# Patient Record
Sex: FEMALE | Race: BLACK OR AFRICAN AMERICAN | NOT HISPANIC OR LATINO | URBAN - METROPOLITAN AREA
[De-identification: names, ages, dates, MRNs, and addresses within clinical notes are randomized per-mention and may not be internally consistent; named-entity substitution may affect disease eponyms.]

---

## 2021-04-25 ENCOUNTER — EMERGENCY (EMERGENCY)
Facility: HOSPITAL | Age: 51
LOS: 1 days | Discharge: HOME | End: 2021-04-25
Attending: EMERGENCY MEDICINE | Admitting: EMERGENCY MEDICINE
Payer: COMMERCIAL

## 2021-04-25 VITALS
SYSTOLIC BLOOD PRESSURE: 162 MMHG | HEART RATE: 96 BPM | RESPIRATION RATE: 18 BRPM | OXYGEN SATURATION: 100 % | DIASTOLIC BLOOD PRESSURE: 113 MMHG | HEIGHT: 64 IN | TEMPERATURE: 98 F

## 2021-04-25 DIAGNOSIS — N97.9 FEMALE INFERTILITY, UNSPECIFIED: Chronic | ICD-10-CM

## 2021-04-25 DIAGNOSIS — Z98.89 OTHER SPECIFIED POSTPROCEDURAL STATES: Chronic | ICD-10-CM

## 2021-04-25 PROCEDURE — 99285 EMERGENCY DEPT VISIT HI MDM: CPT | Mod: 25

## 2021-04-25 PROCEDURE — 93010 ELECTROCARDIOGRAM REPORT: CPT

## 2021-04-25 NOTE — ED ADULT TRIAGE NOTE - CHIEF COMPLAINT QUOTE
c/o left chest pain and left arm pain/pulsing with weakness to left arm since yesterday around 3pm. also feels heart is racing. reports hx gastric ulcer, is on omeprazole and antibiotics x 1-2 weeks. denies other hx.

## 2021-04-26 VITALS
DIASTOLIC BLOOD PRESSURE: 101 MMHG | OXYGEN SATURATION: 100 % | HEART RATE: 82 BPM | RESPIRATION RATE: 17 BRPM | TEMPERATURE: 98 F | SYSTOLIC BLOOD PRESSURE: 145 MMHG

## 2021-04-26 LAB
ALBUMIN SERPL ELPH-MCNC: 4.6 G/DL — SIGNIFICANT CHANGE UP (ref 3.3–5)
ALP SERPL-CCNC: 47 U/L — SIGNIFICANT CHANGE UP (ref 40–120)
ALT FLD-CCNC: 11 U/L — SIGNIFICANT CHANGE UP (ref 4–33)
ANION GAP SERPL CALC-SCNC: 10 MMOL/L — SIGNIFICANT CHANGE UP (ref 7–14)
AST SERPL-CCNC: 21 U/L — SIGNIFICANT CHANGE UP (ref 4–32)
BASOPHILS # BLD AUTO: 0.02 K/UL — SIGNIFICANT CHANGE UP (ref 0–0.2)
BASOPHILS NFR BLD AUTO: 0.4 % — SIGNIFICANT CHANGE UP (ref 0–2)
BILIRUB SERPL-MCNC: 0.5 MG/DL — SIGNIFICANT CHANGE UP (ref 0.2–1.2)
BUN SERPL-MCNC: 8 MG/DL — SIGNIFICANT CHANGE UP (ref 7–23)
CALCIUM SERPL-MCNC: 9.9 MG/DL — SIGNIFICANT CHANGE UP (ref 8.4–10.5)
CHLORIDE SERPL-SCNC: 101 MMOL/L — SIGNIFICANT CHANGE UP (ref 98–107)
CO2 SERPL-SCNC: 27 MMOL/L — SIGNIFICANT CHANGE UP (ref 22–31)
CREAT SERPL-MCNC: 0.8 MG/DL — SIGNIFICANT CHANGE UP (ref 0.5–1.3)
D DIMER BLD IA.RAPID-MCNC: 198 NG/ML DDU — SIGNIFICANT CHANGE UP
EOSINOPHIL # BLD AUTO: 0.03 K/UL — SIGNIFICANT CHANGE UP (ref 0–0.5)
EOSINOPHIL NFR BLD AUTO: 0.6 % — SIGNIFICANT CHANGE UP (ref 0–6)
GLUCOSE SERPL-MCNC: 106 MG/DL — HIGH (ref 70–99)
HCT VFR BLD CALC: 38.4 % — SIGNIFICANT CHANGE UP (ref 34.5–45)
HGB BLD-MCNC: 13.1 G/DL — SIGNIFICANT CHANGE UP (ref 11.5–15.5)
IANC: 2.26 K/UL — SIGNIFICANT CHANGE UP (ref 1.5–8.5)
IMM GRANULOCYTES NFR BLD AUTO: 0.4 % — SIGNIFICANT CHANGE UP (ref 0–1.5)
LIDOCAIN IGE QN: 30 U/L — SIGNIFICANT CHANGE UP (ref 7–60)
LYMPHOCYTES # BLD AUTO: 2.27 K/UL — SIGNIFICANT CHANGE UP (ref 1–3.3)
LYMPHOCYTES # BLD AUTO: 45.9 % — HIGH (ref 13–44)
MCHC RBC-ENTMCNC: 32.4 PG — SIGNIFICANT CHANGE UP (ref 27–34)
MCHC RBC-ENTMCNC: 34.1 GM/DL — SIGNIFICANT CHANGE UP (ref 32–36)
MCV RBC AUTO: 95 FL — SIGNIFICANT CHANGE UP (ref 80–100)
MONOCYTES # BLD AUTO: 0.35 K/UL — SIGNIFICANT CHANGE UP (ref 0–0.9)
MONOCYTES NFR BLD AUTO: 7.1 % — SIGNIFICANT CHANGE UP (ref 2–14)
NEUTROPHILS # BLD AUTO: 2.26 K/UL — SIGNIFICANT CHANGE UP (ref 1.8–7.4)
NEUTROPHILS NFR BLD AUTO: 45.6 % — SIGNIFICANT CHANGE UP (ref 43–77)
NRBC # BLD: 0 /100 WBCS — SIGNIFICANT CHANGE UP
NRBC # FLD: 0 K/UL — SIGNIFICANT CHANGE UP
PLATELET # BLD AUTO: 242 K/UL — SIGNIFICANT CHANGE UP (ref 150–400)
POTASSIUM SERPL-MCNC: 3.8 MMOL/L — SIGNIFICANT CHANGE UP (ref 3.5–5.3)
POTASSIUM SERPL-SCNC: 3.8 MMOL/L — SIGNIFICANT CHANGE UP (ref 3.5–5.3)
PROT SERPL-MCNC: 8.4 G/DL — HIGH (ref 6–8.3)
RBC # BLD: 4.04 M/UL — SIGNIFICANT CHANGE UP (ref 3.8–5.2)
RBC # FLD: 11.9 % — SIGNIFICANT CHANGE UP (ref 10.3–14.5)
SODIUM SERPL-SCNC: 138 MMOL/L — SIGNIFICANT CHANGE UP (ref 135–145)
TROPONIN T, HIGH SENSITIVITY RESULT: <6 NG/L — SIGNIFICANT CHANGE UP
WBC # BLD: 4.95 K/UL — SIGNIFICANT CHANGE UP (ref 3.8–10.5)
WBC # FLD AUTO: 4.95 K/UL — SIGNIFICANT CHANGE UP (ref 3.8–10.5)

## 2021-04-26 PROCEDURE — 71045 X-RAY EXAM CHEST 1 VIEW: CPT | Mod: 26

## 2021-04-26 RX ORDER — LIDOCAINE 4 G/100G
10 CREAM TOPICAL ONCE
Refills: 0 | Status: COMPLETED | OUTPATIENT
Start: 2021-04-26 | End: 2021-04-26

## 2021-04-26 RX ORDER — SUCRALFATE 1 G
1 TABLET ORAL
Qty: 120 | Refills: 0
Start: 2021-04-26 | End: 2021-05-25

## 2021-04-26 RX ORDER — LIDOCAINE 4 G/100G
1 CREAM TOPICAL ONCE
Refills: 0 | Status: COMPLETED | OUTPATIENT
Start: 2021-04-26 | End: 2021-04-26

## 2021-04-26 RX ORDER — SUCRALFATE 1 G
1 TABLET ORAL ONCE
Refills: 0 | Status: COMPLETED | OUTPATIENT
Start: 2021-04-26 | End: 2021-04-26

## 2021-04-26 RX ADMIN — LIDOCAINE 1 PATCH: 4 CREAM TOPICAL at 02:37

## 2021-04-26 RX ADMIN — LIDOCAINE 10 MILLILITER(S): 4 CREAM TOPICAL at 02:52

## 2021-04-26 RX ADMIN — Medication 1 GRAM(S): at 02:52

## 2021-04-26 RX ADMIN — Medication 30 MILLILITER(S): at 02:52

## 2021-04-26 NOTE — ED PROVIDER NOTE - PSH
Female infertility  s/p oocyte retrieval x 4  H/O abdominoplasty    H/O arthroscopy of right knee    H/O  section

## 2021-04-26 NOTE — ED PROVIDER NOTE - PMH
Asthma  last episode 2013  Infertility, female    Knee injury  right  Tubal occlusion  fallopian tubes

## 2021-04-26 NOTE — ED PROVIDER NOTE - PHYSICAL EXAMINATION
GEN: Patient awake alert NAD.   HEENT: normocephalic, atraumatic, EOMI, no scleral icterus, moist MM  CARDIAC: RRR, S1, S2, no murmur.   PULM: CTA B/L no wheeze, rhonchi, rales.   ABD: + ttp pigastric and LUQ, ND, no rebound no guarding  MSK: Moving all extremities, no edema. 5/5 strength and full ROM in all extremities.     NEURO: A&Ox3, gait normal, no focal neurological deficits, CN 2-12 grossly intact  SKIN: warm, dry, no rash.

## 2021-04-26 NOTE — ED PROVIDER NOTE - PROGRESS NOTE DETAILS
Pt is very ecstatic as all pain has resolved. She noted heaviness has resolved for the first time. Labs show negative trop, can follow up with outpatient PMD and GI.

## 2021-04-26 NOTE — ED PROVIDER NOTE - NS ED ROS FT
GENERAL: No fever, chills  EYES: no vision changes, no discharge.   ENT: no difficulty swallowing or speaking   CARDIAC: no chest pain/pressure, SOB, lower extremity swelling + palliations.   PULMONARY: no cough, SOB  GI: + abdominal pain, no n/v/d  : no dysuria, no hematuria  SKIN: no rashes, no ecchymosis  NEURO: no headache, lightheadedness  MSK: + L arm pain, No joint pain, myalgia, weakness.

## 2021-04-26 NOTE — ED PROVIDER NOTE - PATIENT PORTAL LINK FT
You can access the FollowMyHealth Patient Portal offered by [facility] by registering at the following website: [email or web address]. By joining [employer]’s FollowMyHealth portal, you will also be able to view your health information using other applications (apps) compatible with our system.

## 2021-04-26 NOTE — ED PROVIDER NOTE - OBJECTIVE STATEMENT
49 yo F H pylori on triple therapy for ~2 weeks, no other medical problems, pw left arm pain, palpitations and abdominal pain x 2 days. triage note read, no chest pain, only palpitations, no associated SOB. L arm pain is achy, heaviness. Abd pain is epigastric and LUQ, sharp, constant, associated with mild nausea. no vomiting, diaphoresis, gu, symptoms.

## 2021-04-26 NOTE — ED ADULT NURSE NOTE - NSFALLRSKASSESSDT_ED_ALL_ED
Notified of hand xray that showed arthritis and degenerative changes, but no foreign bodies.   26-Apr-2021 03:01

## 2021-04-26 NOTE — ED PROVIDER NOTE - NSFOLLOWUPINSTRUCTIONS_ED_ALL_ED_FT
You were seen in the Emergency Department (ED) for arm pain, palpitations, and abdominal pain.     You are prescribed _. Please take as directed by your pharmacists.   Please take over the counter Tylenol or Ibuprofen as directed by the packaging for your pain.     Please follow up with your primary care doctor in the next 72 hours.     Please return to the ED if you experience any new or concerning symptoms, such as:   - worsening pain  - chest pain  - difficulty breathing  - passing out  - unable to eat or drink  - unable to move or feel part of your body  - fever, chills    Thank you for visiting a [facility] ED. You were seen in the Emergency Department (ED) for arm pain, palpitations, and abdominal pain.     You are prescribed Carafate. Please take 4 times a day as needed.   Take over the counter maalox according to packaging directions for as needed for your pain.     Please follow up with your GI and primary care doctor.     Please return to the ED if you experience any new or concerning symptoms, such as:   - worsening abdominal pain  - chest pain  - difficulty breathing  - passing out  - unable to eat or drink  - unable to move or feel part of your body  - fever, chills    Thank you for visiting a [facility] ED.

## 2021-04-26 NOTE — ED PROVIDER NOTE - CLINICAL SUMMARY MEDICAL DECISION MAKING FREE TEXT BOX
49 yo F H pylori on triple therapy for ~2 weeks, no other medical problems, pw left arm pain, palpitations and abdominal pain x 2 days. Likely cervical radiculopathy. Low suspicion for ACS given no chest pain only palpitations, will obtain trops and EKG, CXR. Lidocaine patch for pain. Possible gastritis or pancreatitis. Likely DC home.

## 2021-04-26 NOTE — ED ADULT NURSE NOTE - OBJECTIVE STATEMENT
50 yr old female pt presents to ED for evaluation of chest pain. pt describes left sided chest pain radiating to left arm and epigastric pain with associated n+v.

## 2021-04-26 NOTE — ED PROVIDER NOTE - ATTENDING CONTRIBUTION TO CARE
Attending note:   After face to face evaluation of this patient, I concur with above noted hx, pe, and care plan for this patient.  Herrmann: 50 yof with hx of gastritis on therapy for two weeks. Pt complaining of left sided chest pain, left arm pain, burning pain in throat, heaviness in epigastric area with nausea. Pt appears uncomfortable, clear lungs, normal cardiac, mild epigastric reproducible tenderness, no CVAT, no edema, pulses equal in all ext, neuro exam is unremarkable. EKG NSR. Pt with left sided chest pain and epigastric pain. Sxs are most likely related to gastritis and not well treated on current meds but will r/o cardiac with labs, EKG, CXR. Sx relief for gastritis and reassess.

## 2021-05-10 ENCOUNTER — OUTPATIENT (OUTPATIENT)
Dept: OUTPATIENT SERVICES | Facility: HOSPITAL | Age: 51
LOS: 1 days | End: 2021-05-10
Payer: COMMERCIAL

## 2021-05-10 ENCOUNTER — APPOINTMENT (OUTPATIENT)
Dept: MAMMOGRAPHY | Facility: IMAGING CENTER | Age: 51
End: 2021-05-10
Payer: COMMERCIAL

## 2021-05-10 DIAGNOSIS — Z98.89 OTHER SPECIFIED POSTPROCEDURAL STATES: Chronic | ICD-10-CM

## 2021-05-10 DIAGNOSIS — Z00.8 ENCOUNTER FOR OTHER GENERAL EXAMINATION: ICD-10-CM

## 2021-05-10 DIAGNOSIS — N97.9 FEMALE INFERTILITY, UNSPECIFIED: Chronic | ICD-10-CM

## 2021-05-10 PROCEDURE — 77063 BREAST TOMOSYNTHESIS BI: CPT | Mod: 26

## 2021-05-10 PROCEDURE — 77067 SCR MAMMO BI INCL CAD: CPT | Mod: 26

## 2021-05-10 PROCEDURE — 77067 SCR MAMMO BI INCL CAD: CPT

## 2021-05-10 PROCEDURE — 77063 BREAST TOMOSYNTHESIS BI: CPT

## 2021-10-06 ENCOUNTER — APPOINTMENT (OUTPATIENT)
Dept: RADIOLOGY | Facility: CLINIC | Age: 51
End: 2021-10-06
Payer: COMMERCIAL

## 2021-10-06 PROCEDURE — 73562 X-RAY EXAM OF KNEE 3: CPT | Mod: 50

## 2021-10-11 ENCOUNTER — APPOINTMENT (OUTPATIENT)
Dept: PLASTIC SURGERY | Facility: CLINIC | Age: 51
End: 2021-10-11
Payer: SELF-PAY

## 2021-10-11 VITALS
TEMPERATURE: 97.6 F | HEART RATE: 78 BPM | SYSTOLIC BLOOD PRESSURE: 153 MMHG | BODY MASS INDEX: 29.53 KG/M2 | DIASTOLIC BLOOD PRESSURE: 94 MMHG | HEIGHT: 64 IN | WEIGHT: 173 LBS | OXYGEN SATURATION: 99 %

## 2021-10-11 PROCEDURE — 99213 OFFICE O/P EST LOW 20 MIN: CPT

## 2021-10-11 PROCEDURE — 99499 UNLISTED E&M SERVICE: CPT | Mod: NC

## 2021-10-26 NOTE — REASON FOR VISIT
[Follow-Up: _____] : a [unfilled] follow-up visit [FreeTextEntry1] : s/p abdominoplasty DOS 07/13/15. pt reports she is unhappy with how her belly button looks, here to discuss possible revision.

## 2021-10-26 NOTE — PHYSICAL EXAM
[NI] : Normal [de-identified] : Excellent result and scarring from abdominoplasty.  Belly button clean excellent scarring no sign of infection no drainage or redness.  Some folds of skin in belly button may be causing or trapping wetness.   [de-identified] : Multiple folds in umbilicus.

## 2021-10-26 NOTE — HISTORY OF PRESENT ILLNESS
[FreeTextEntry1] : Pt s/p abdominoplasty on 7/13/15.  Pt states she has been having problems with her belly button and would like to discuss revision.  Pt states that no matter how much she cleans her belly button she feels she has an odor coming from it and feels she gets like a fungal infection.  She states she does not have any infection or odor today but she will get it from time to time.

## 2021-11-24 ENCOUNTER — APPOINTMENT (OUTPATIENT)
Dept: RADIOLOGY | Facility: CLINIC | Age: 51
End: 2021-11-24
Payer: COMMERCIAL

## 2021-11-24 ENCOUNTER — OUTPATIENT (OUTPATIENT)
Dept: OUTPATIENT SERVICES | Facility: HOSPITAL | Age: 51
LOS: 1 days | End: 2021-11-24
Payer: COMMERCIAL

## 2021-11-24 DIAGNOSIS — Z98.89 OTHER SPECIFIED POSTPROCEDURAL STATES: Chronic | ICD-10-CM

## 2021-11-24 DIAGNOSIS — Z00.8 ENCOUNTER FOR OTHER GENERAL EXAMINATION: ICD-10-CM

## 2021-11-24 DIAGNOSIS — N97.9 FEMALE INFERTILITY, UNSPECIFIED: Chronic | ICD-10-CM

## 2021-11-24 PROCEDURE — 72110 X-RAY EXAM L-2 SPINE 4/>VWS: CPT | Mod: 26

## 2021-11-24 PROCEDURE — 72110 X-RAY EXAM L-2 SPINE 4/>VWS: CPT

## 2021-12-07 ENCOUNTER — APPOINTMENT (OUTPATIENT)
Dept: ORTHOPEDIC SURGERY | Facility: CLINIC | Age: 51
End: 2021-12-07
Payer: COMMERCIAL

## 2021-12-07 VITALS — WEIGHT: 173 LBS | BODY MASS INDEX: 29.53 KG/M2 | HEIGHT: 64 IN

## 2021-12-07 DIAGNOSIS — M54.42 LUMBAGO WITH SCIATICA, LEFT SIDE: ICD-10-CM

## 2021-12-07 DIAGNOSIS — M54.16 RADICULOPATHY, LUMBAR REGION: ICD-10-CM

## 2021-12-07 DIAGNOSIS — Z87.898 PERSONAL HISTORY OF OTHER SPECIFIED CONDITIONS: ICD-10-CM

## 2021-12-07 DIAGNOSIS — Z87.09 PERSONAL HISTORY OF OTHER DISEASES OF THE RESPIRATORY SYSTEM: ICD-10-CM

## 2021-12-07 DIAGNOSIS — M51.36 OTHER INTERVERTEBRAL DISC DEGENERATION, LUMBAR REGION: ICD-10-CM

## 2021-12-07 DIAGNOSIS — M54.12 RADICULOPATHY, CERVICAL REGION: ICD-10-CM

## 2021-12-07 DIAGNOSIS — M54.2 CERVICALGIA: ICD-10-CM

## 2021-12-07 DIAGNOSIS — M48.061 SPINAL STENOSIS, LUMBAR REGION WITHOUT NEUROGENIC CLAUDICATION: ICD-10-CM

## 2021-12-07 DIAGNOSIS — M47.816 SPONDYLOSIS W/OUT MYELOPATHY OR RADICULOPATHY, LUMBAR REGION: ICD-10-CM

## 2021-12-07 DIAGNOSIS — M47.812 SPONDYLOSIS W/OUT MYELOPATHY OR RADICULOPATHY, CERVICAL REGION: ICD-10-CM

## 2021-12-07 PROCEDURE — 99205 OFFICE O/P NEW HI 60 MIN: CPT

## 2021-12-07 RX ORDER — DICLOFENAC SODIUM 75 MG/1
75 TABLET, DELAYED RELEASE ORAL
Qty: 60 | Refills: 0 | Status: ACTIVE | COMMUNITY
Start: 2021-12-07 | End: 1900-01-01

## 2021-12-07 RX ORDER — OMEPRAZOLE 40 MG/1
40 CAPSULE, DELAYED RELEASE ORAL
Qty: 30 | Refills: 1 | Status: ACTIVE | COMMUNITY
Start: 2021-12-07 | End: 1900-01-01

## 2021-12-07 NOTE — PHYSICAL EXAM
[de-identified] : She is fully alert and oriented with a normal mood and affect.  She is in no acute distress as I take the history.  She ambulates with a normal gait including tiptoe and heel walking.  There are no cutaneous abnormalities or palpable bony defects of the spine.  There is no evidence of shortness of breath or respiratory distress.  There is no paravertebral muscle spasm, sciatic notch tenderness or trochanteric tenderness.  Her lower extremity neurological examination revealed 1+ symmetrical reflexes.  Motor power is normal to manual testing in all lower extremity groups and sensation is normal to light touch in all dermatomes.  Straight leg raising is negative to 90 degrees in the sitting position.  Her hips and her knees have a full and painless range of motion with normal stability.  Vascular examination shows no evidence of varicosities and there is no lymphedema.  There are no cutaneous abnormalities of the upper or lower extremities.  Her upper extremity neurological examination again revealed 1+ symmetrical reflexes.  Motor power is normal to manual testing in all upper extremity groups and sensation is normal to light touch in all dermatomes.  Shoulder range of motion is full and symmetrical with some discomfort at the extremes on the right.  Range of motion of the cervical spine showed flexion with the chin reaching to within 2 fingerbreadths of the chest, extension is limited to 40 degrees by pain with rotation of 60 degrees bilaterally with pain and tilt of 20 degrees bilaterally with pain. [de-identified] : I reviewed outside plain x-rays of the cervical spine that revealed moderate multilevel degenerative changes with disc space narrowing and osteophyte formation.  There is a mild straightening of the normal cervical lordosis.  Sagittal alignment is otherwise normal.  There are no destructive changes.\par \par I revealed outside plain x-rays of the cervical spine that revealed mild multilevel degenerative changes.  Again sagittal alignment is normal and there are no destructive changes.\par \par I reviewed an outside stand-up MRI of poor quality.  The films are very grainy.  There appears to be mild stenosis at the L3-4 level.  There are changes of significant facet arthrosis responsible for that mild stenosis.  There is no evidence of a fracture or destructive change.

## 2021-12-07 NOTE — HISTORY OF PRESENT ILLNESS
[de-identified] : This 50-year-old woman had an episode of significant back pain 20 years ago but has done well for many years.  Approximately November 10 she had the onset of right-sided lower back pain that she grades as a 6 with some radiation to the left buttock and posterior thigh to the calf and ankle that she grades as a 9.  She has not had right leg pain.  She has had some tingling and burning in the leg but denies numbness.  There is no Valsalva effect.  She has had night pain.  The pain is no worse sitting or driving.  It is worse standing and walking.\par \par 10 days after that she had the onset of bilateral neck pain that she grades as a 9-1/2 with some radiation to the right arm that she grades as an 8.  She has not had left arm pain.  She has had some symptoms of numbness and paresthesias in the right arm.  Again there is no Valsalva effect.  Treatment has only been IcyHot and topical medicines and heat.  She had a trial of meloxicam without benefit and then was started on Flexeril and then Voltaren 50 mg twice a day as well as OxyContin.  She has had symptoms of heartburn in the past.  She works as a teacher.  She had been going to the gym regularly prior to this. [Pain Location] : pain [Worsening] : worsening [9] : a minimum pain level of 9/10 [10] : a maximum pain level of 10/10

## 2021-12-07 NOTE — DISCUSSION/SUMMARY
[Medication Risks Reviewed] : Medication risks reviewed [de-identified] : She will rest and use moist heat.  She has been taking diclofenac 50 mg twice a day and I have increased it to 75 mg twice a day.  In light of her history of heartburn symptoms in the past she has been started on omeprazole 40 mg once a day.  She will be out of work for the next week.  She will call if there are problems with the medication or worsening of her symptoms and I will see her for follow-up in 3-1/2 weeks.

## 2021-12-15 ENCOUNTER — APPOINTMENT (OUTPATIENT)
Dept: MRI IMAGING | Facility: CLINIC | Age: 51
End: 2021-12-15

## 2021-12-27 ENCOUNTER — APPOINTMENT (OUTPATIENT)
Dept: PLASTIC SURGERY | Facility: CLINIC | Age: 51
End: 2021-12-27

## 2021-12-27 ENCOUNTER — APPOINTMENT (OUTPATIENT)
Dept: ORTHOPEDIC SURGERY | Facility: CLINIC | Age: 51
End: 2021-12-27

## 2022-03-02 ENCOUNTER — EMERGENCY (EMERGENCY)
Facility: HOSPITAL | Age: 52
LOS: 1 days | Discharge: HOME | End: 2022-03-02
Attending: STUDENT IN AN ORGANIZED HEALTH CARE EDUCATION/TRAINING PROGRAM | Admitting: STUDENT IN AN ORGANIZED HEALTH CARE EDUCATION/TRAINING PROGRAM
Payer: COMMERCIAL

## 2022-03-02 VITALS
DIASTOLIC BLOOD PRESSURE: 88 MMHG | RESPIRATION RATE: 18 BRPM | SYSTOLIC BLOOD PRESSURE: 148 MMHG | HEIGHT: 64 IN | HEART RATE: 77 BPM | TEMPERATURE: 99 F | OXYGEN SATURATION: 100 %

## 2022-03-02 DIAGNOSIS — N97.9 FEMALE INFERTILITY, UNSPECIFIED: Chronic | ICD-10-CM

## 2022-03-02 DIAGNOSIS — Z98.89 OTHER SPECIFIED POSTPROCEDURAL STATES: Chronic | ICD-10-CM

## 2022-03-02 LAB
ALBUMIN SERPL ELPH-MCNC: 4.3 G/DL — SIGNIFICANT CHANGE UP (ref 3.3–5)
ALP SERPL-CCNC: 51 U/L — SIGNIFICANT CHANGE UP (ref 40–120)
ALT FLD-CCNC: 13 U/L — SIGNIFICANT CHANGE UP (ref 4–33)
ANION GAP SERPL CALC-SCNC: 10 MMOL/L — SIGNIFICANT CHANGE UP (ref 7–14)
AST SERPL-CCNC: 16 U/L — SIGNIFICANT CHANGE UP (ref 4–32)
BASOPHILS # BLD AUTO: 0.02 K/UL — SIGNIFICANT CHANGE UP (ref 0–0.2)
BASOPHILS NFR BLD AUTO: 0.5 % — SIGNIFICANT CHANGE UP (ref 0–2)
BILIRUB SERPL-MCNC: 0.8 MG/DL — SIGNIFICANT CHANGE UP (ref 0.2–1.2)
BUN SERPL-MCNC: 11 MG/DL — SIGNIFICANT CHANGE UP (ref 7–23)
CALCIUM SERPL-MCNC: 9.4 MG/DL — SIGNIFICANT CHANGE UP (ref 8.4–10.5)
CHLORIDE SERPL-SCNC: 100 MMOL/L — SIGNIFICANT CHANGE UP (ref 98–107)
CO2 SERPL-SCNC: 25 MMOL/L — SIGNIFICANT CHANGE UP (ref 22–31)
CREAT SERPL-MCNC: 0.85 MG/DL — SIGNIFICANT CHANGE UP (ref 0.5–1.3)
EGFR: 82 ML/MIN/1.73M2 — SIGNIFICANT CHANGE UP
EOSINOPHIL # BLD AUTO: 0.1 K/UL — SIGNIFICANT CHANGE UP (ref 0–0.5)
EOSINOPHIL NFR BLD AUTO: 2.7 % — SIGNIFICANT CHANGE UP (ref 0–6)
GLUCOSE SERPL-MCNC: 103 MG/DL — HIGH (ref 70–99)
HCT VFR BLD CALC: 37.2 % — SIGNIFICANT CHANGE UP (ref 34.5–45)
HGB BLD-MCNC: 12.7 G/DL — SIGNIFICANT CHANGE UP (ref 11.5–15.5)
IANC: 1.68 K/UL — SIGNIFICANT CHANGE UP (ref 1.5–8.5)
IMM GRANULOCYTES NFR BLD AUTO: 0.3 % — SIGNIFICANT CHANGE UP (ref 0–1.5)
LYMPHOCYTES # BLD AUTO: 1.69 K/UL — SIGNIFICANT CHANGE UP (ref 1–3.3)
LYMPHOCYTES # BLD AUTO: 45.4 % — HIGH (ref 13–44)
MCHC RBC-ENTMCNC: 32.4 PG — SIGNIFICANT CHANGE UP (ref 27–34)
MCHC RBC-ENTMCNC: 34.1 GM/DL — SIGNIFICANT CHANGE UP (ref 32–36)
MCV RBC AUTO: 94.9 FL — SIGNIFICANT CHANGE UP (ref 80–100)
MONOCYTES # BLD AUTO: 0.22 K/UL — SIGNIFICANT CHANGE UP (ref 0–0.9)
MONOCYTES NFR BLD AUTO: 5.9 % — SIGNIFICANT CHANGE UP (ref 2–14)
NEUTROPHILS # BLD AUTO: 1.68 K/UL — LOW (ref 1.8–7.4)
NEUTROPHILS NFR BLD AUTO: 45.2 % — SIGNIFICANT CHANGE UP (ref 43–77)
NRBC # BLD: 0 /100 WBCS — SIGNIFICANT CHANGE UP
NRBC # FLD: 0 K/UL — SIGNIFICANT CHANGE UP
PLATELET # BLD AUTO: 250 K/UL — SIGNIFICANT CHANGE UP (ref 150–400)
POTASSIUM SERPL-MCNC: 4.2 MMOL/L — SIGNIFICANT CHANGE UP (ref 3.5–5.3)
POTASSIUM SERPL-SCNC: 4.2 MMOL/L — SIGNIFICANT CHANGE UP (ref 3.5–5.3)
PROT SERPL-MCNC: 7.8 G/DL — SIGNIFICANT CHANGE UP (ref 6–8.3)
RBC # BLD: 3.92 M/UL — SIGNIFICANT CHANGE UP (ref 3.8–5.2)
RBC # FLD: 12.3 % — SIGNIFICANT CHANGE UP (ref 10.3–14.5)
SARS-COV-2 RNA SPEC QL NAA+PROBE: SIGNIFICANT CHANGE UP
SODIUM SERPL-SCNC: 135 MMOL/L — SIGNIFICANT CHANGE UP (ref 135–145)
WBC # BLD: 3.72 K/UL — LOW (ref 3.8–10.5)
WBC # FLD AUTO: 3.72 K/UL — LOW (ref 3.8–10.5)

## 2022-03-02 PROCEDURE — 76830 TRANSVAGINAL US NON-OB: CPT | Mod: 26

## 2022-03-02 PROCEDURE — 99285 EMERGENCY DEPT VISIT HI MDM: CPT

## 2022-03-02 RX ORDER — KETOROLAC TROMETHAMINE 30 MG/ML
15 SYRINGE (ML) INJECTION ONCE
Refills: 0 | Status: DISCONTINUED | OUTPATIENT
Start: 2022-03-02 | End: 2022-03-02

## 2022-03-02 RX ADMIN — Medication 15 MILLIGRAM(S): at 08:43

## 2022-03-02 NOTE — ED PROVIDER NOTE - NSICDXPASTMEDICALHX_GEN_ALL_CORE_FT
PAST MEDICAL HISTORY:  Asthma last episode 2013    Infertility, female     Knee injury right    Tubal occlusion fallopian tubes

## 2022-03-02 NOTE — ED ADULT NURSE REASSESSMENT NOTE - NS ED NURSE REASSESS COMMENT FT1
intake pt coming with Vag. bleeding interm.  since January 31, 2022 with LLQ pain, pt with Hx. L. ovarian cyst in the past.  denies dizziness, no N/V at present time.  labs sent IV to right Ac 20g angio cath. pt to US.  Shanda Cain RN

## 2022-03-02 NOTE — ED PROVIDER NOTE - OBJECTIVE STATEMENT
51 year old female with no PMHx presenting with vaginal bleeding since Jan 31 and generalized abdominal pain. Patient states her periods were always regular every 28 days and never took any birth control. Patient states her last "normal" period was October 2021, and starting in November her periods became heavier and irregular. Patient states since Jan 31, she has been bleeding more on than off, and has been using 1-2 pads a day, but mostly notices her bleeding when she goes to the bathroom. Last Friday, patient began to feel weak, fatigued and lightheaded. Patient went to her OBGYN last Friday and was prescribed Mexdroxyprogesterone to help stop the bleeding but it did not help. [facility] but was told to come to the ED if her bleeding persisted and if she continued to feel weak. Today, patient feels lightheaded, weak, and generalized abdominal cramping. Patient last took tylenol yesterday morning with some relief. Patient denies ever needing a transfusion previously. Patient denies chest pain, SOB, fevers, chills, nausea, vomiting, diarrhea, palpitations, dysuria. 51 year old female with no PMHx presenting with vaginal bleeding since 1/31/22 and generalized abdominal pain. Patient states her periods were always regular every 28 days and never took any birth control. Patient states her last "normal" period was October 2021, and starting in November her periods became heavier and irregular. Patient states since Jan 31, she has been bleeding more on than off, and has been using 1-2 pads a day, but mostly notices her bleeding when she goes to the bathroom. Last Friday, patient began to feel weak, fatigued and lightheaded. Patient went to her OBGYN last Friday and was prescribed Mexdroxyprogesterone to help stop the bleeding but it did not help. [facility] but was told to come to the ED if her bleeding persisted and if she continued to feel weak. Today, patient feels lightheaded, weak, and generalized abdominal cramping. Patient last took tylenol yesterday morning with some relief. Patient denies ever needing a transfusion previously. Patient denies chest pain, SOB, fevers, chills, nausea, vomiting, diarrhea, palpitations, dysuria.

## 2022-03-02 NOTE — ED PROVIDER NOTE - NSFOLLOWUPINSTRUCTIONS_ED_ALL_ED_FT
See your primary care doctor within 24-48 hours. See your OBGYN this week for further evaluation of the possible uterine polyp, bring copies of all reports with you. Take Motrin 600mg every 8hrs with food for pain. Take all current medications as prescribed. Return to the ER for worsening symptoms, dizziness, shortness of breath, or any other concerns.

## 2022-03-02 NOTE — ED PROVIDER NOTE - PATIENT PORTAL LINK FT
You can access the FollowMyHealth Patient Portal offered by [facility] by registering at the following website: [email or web address]. By joining [employer]’s FollowMyHealth portal, you will also be able to view your health information using other applications (apps) compatible with our system.

## 2022-03-02 NOTE — ED PROVIDER NOTE - PROGRESS NOTE DETAILS
ANTIONETTE Gaona: Pt reassessed, minimal bleeding on pelvic exam, Hgb wnl. Pt to f/u with her ob next week. Informed will need further outpt management of uterine polyp. Supervising Statement (JUNIOR Gaona, PA-C): I have personally seen and examined this patient.  I have fully participated in the care of this patient. I have reviewed all pertinent clinical information, including history, physical exam, plan and PA student’s note and agree as noted.

## 2022-03-02 NOTE — ED PROVIDER NOTE - NSICDXPASTSURGICALHX_GEN_ALL_CORE_FT
PAST SURGICAL HISTORY:  Female infertility s/p oocyte retrieval x 4    H/O abdominoplasty     H/O arthroscopy of right knee     H/O  section

## 2022-03-02 NOTE — ED PROVIDER NOTE - GASTROINTESTINAL, MLM
Abdomen soft, non distended, no guarding. Generalized tenderness to palpation. Abdomen soft, non distended, no guarding.

## 2022-03-02 NOTE — ED PROVIDER NOTE - CLINICAL SUMMARY MEDICAL DECISION MAKING FREE TEXT BOX
51 year old female with no PMHx presenting with vaginal bleeding since Jan 31 and generalized abdominal pain with associated weakness, fatigue, and lightheadedness. Going to get US, pain control, and basic labs to check H&H as well as T&S in case blood transfusion is needed. 51 year old female with no PMHx presenting with vaginal bleeding since Jan 31 and abdominal cramps with associated weakness, fatigue, and lightheadedness. Will check cbc to eval for anemia. TVUS to evaluate underlying pelvic anatomy given difficulty obtaining outpt results.

## 2022-03-02 NOTE — ED PROVIDER NOTE - NSICDXFAMILYHX_GEN_ALL_CORE_FT
FAMILY HISTORY:  Grandparent  Still living? Yes, Estimated age: Age Unknown  Family history of hypertension, Age at diagnosis: Age Unknown

## 2022-03-02 NOTE — ED PROVIDER NOTE - RESPIRATORY, MLM
Breath sounds clear and equal bilaterally. No accessory muscle use or intercostal retractions. No wheezing, rales, rhonchi

## 2022-03-02 NOTE — ED PROVIDER NOTE - ATTENDING CONTRIBUTION TO CARE
I performed a face-to-face evaluation of the patient and performed a history and physical examination. I agree with the history and physical examination. If this was a PA visit, I personally saw the patient with the PA and performed a substantive portion of the visit including all aspects of the medical decision making.    51 year old female with no PMHx presenting with vaginal bleeding since Jan 31 and generalized abdominal pain. Patient states her periods were always regular every 28 days and never took any birth control. Patient states her last "normal" period was October 2021, and starting in November her periods became heavier and irregular. Patient states since Jan 31, she has been bleeding more on than off, and has been using 1-2 pads a day, but mostly notices her bleeding when she goes to the bathroom. Last Friday, patient began to feel weak, fatigued and lightheaded. Patient went to her OBGYN last Friday and was prescribed Mexdroxyprogesterone to help stop the bleeding but it did not help. [facility] but was told to come to the ED if her bleeding persisted and if she continued to feel weak. Today, patient feels lightheaded, weak, and generalized abdominal cramping. Patient last took tylenol yesterday morning with some relief. Patient denies ever needing a transfusion previously. Patient denies chest pain, SOB, fevers, chills, nausea, vomiting, diarrhea, palpitations, dysuria.  pt well appearing, vitals wnl, abd mildly tender, will check labs h/h UA, US

## 2022-05-11 ENCOUNTER — APPOINTMENT (OUTPATIENT)
Dept: MAMMOGRAPHY | Facility: CLINIC | Age: 52
End: 2022-05-11
Payer: COMMERCIAL

## 2022-05-11 PROCEDURE — 77063 BREAST TOMOSYNTHESIS BI: CPT

## 2022-05-11 PROCEDURE — 77067 SCR MAMMO BI INCL CAD: CPT

## 2022-07-19 ENCOUNTER — APPOINTMENT (OUTPATIENT)
Dept: ORTHOPEDIC SURGERY | Facility: CLINIC | Age: 52
End: 2022-07-19

## 2022-07-19 DIAGNOSIS — S83.282A OTHER TEAR OF LATERAL MENISCUS, CURRENT INJURY, LEFT KNEE, INITIAL ENCOUNTER: ICD-10-CM

## 2022-07-19 DIAGNOSIS — S83.206A UNSPECIFIED TEAR OF UNSPECIFIED MENISCUS, CURRENT INJURY, RIGHT KNEE, INITIAL ENCOUNTER: ICD-10-CM

## 2022-07-19 PROCEDURE — 99213 OFFICE O/P EST LOW 20 MIN: CPT

## 2022-07-19 NOTE — DISCUSSION/SUMMARY
[de-identified] : He was referred to our knee specialist for the further evaluation as she understands I do not treat these kinds of knee problems.

## 2022-07-19 NOTE — PHYSICAL EXAM
Elida,     Patient coming in on 11/19 for Nexplanon removal and Paragard insertion.    [de-identified] : He comes in today wearing a sleeve on her left knee.  The knee is not warm or swollen.  The collateral and cruciate ligaments are stable.  There is mild medial joint line tenderness and moderate lateral joint line tenderness and tenderness in the proximal tibiofibular joint. [de-identified] : The MRI describes meniscal changes with some edema in the lateral tibial plateau adjacent to the tibiofibular joint and some mucoid degeneration of the ACL.

## 2022-07-22 ENCOUNTER — APPOINTMENT (OUTPATIENT)
Dept: ORTHOPEDIC SURGERY | Facility: CLINIC | Age: 52
End: 2022-07-22

## 2022-07-22 VITALS — HEIGHT: 64 IN | WEIGHT: 169 LBS | BODY MASS INDEX: 28.85 KG/M2

## 2022-07-22 DIAGNOSIS — M17.12 UNILATERAL PRIMARY OSTEOARTHRITIS, LEFT KNEE: ICD-10-CM

## 2022-07-22 PROCEDURE — 99204 OFFICE O/P NEW MOD 45 MIN: CPT

## 2022-07-22 PROCEDURE — 99214 OFFICE O/P EST MOD 30 MIN: CPT

## 2022-07-22 PROCEDURE — 73564 X-RAY EXAM KNEE 4 OR MORE: CPT | Mod: LT

## 2022-07-26 ENCOUNTER — APPOINTMENT (OUTPATIENT)
Dept: HUMAN REPRODUCTION | Facility: CLINIC | Age: 52
End: 2022-07-26

## 2022-11-11 PROBLEM — M17.12 PRIMARY OSTEOARTHRITIS OF LEFT KNEE: Status: ACTIVE | Noted: 2022-11-11

## 2022-11-11 NOTE — DISCUSSION/SUMMARY
[de-identified] : 51-year-old female with left knee osteoarthritis/root tear medial meniscus\par \par Discussion was had with the patient regarding MRI and x-ray findings consistent with early arthrosis through the medial compartment.  Patient has tricompartmental disease, but majority of symptoms appear to be related through the medial joint consistent with high-grade radial tear/root tear of the meniscus.  Patient also has evidence of subchondral injury to the tibia with associated soft tissue edema.  This was discussed in detail with the patient, and discussion was had with the patient regarding conservative management.\par \par Recommendation: Conservative care & observation, this includes rest/activity avoidance until less symptomatic with subsequent gradual return to full activity as tolerated. Patient may also use OTC NSAID's or acetaminophen as tolerated, with application of ice to the area 2-3x daily for 20 minutes after periods of activity. Bracing as discussed.\par \par Follow-up 2 to 3 months if no improvement.

## 2022-11-11 NOTE — PHYSICAL EXAM
[de-identified] : Oriented to time, place, person\par Mood: Normal\par Affect: Normal\par Appearance: Healthy, well appearing, no acute distress.\par Gait: Normal\par Assistive Devices: None\par \par Left knee exam\par \par Skin: Clean, dry, intact\par Inspection: No obvious malalignment, no masses, no swelling, no effusion\par Pulses: 2+ DP/PT pulses\par ROM: 0-120 degrees of flexion.  Positive pain with deep knee flexion/extension.\par Tenderness: Positive MJLT. No LJLT. No pain over the patella facets. No pain to the quadriceps tendon. No pain to the patella tendon.  Mild posterior knee tenderness.\par Stability: Stable to varus, valgus. Negative lachman testing. Negative anterior drawer, negative posterior drawer.\par Strength: 5/5 Q/H/TA/GS/EHL, without atrophy\par Neuro: In tact to light touch throughout, DTR's normal\par Additional tests: +McMurrays test, Negative patellar grind test. \par  [de-identified] : \par The following radiographs were ordered and read by me during this patients visit. I reviewed each radiograph in detail with the patient and discussed the findings as highlighted below. \par \par 4 views of the left knee were obtained today that show no acute fracture or dislocation. There is mild medial, min lateral and mild patellofemoral degenerative change seen. There is no significant malalignment. No significant other obvious osseous abnormality, otherwise unremarkable.\par \par MRI left knee dated 6.22.22 shows evidence of radial tear posterior root medial meniscus.  Subchondral fracture with medial joint arthrosis.

## 2022-11-11 NOTE — HISTORY OF PRESENT ILLNESS
[de-identified] : 51-year-old female presents for evaluation left knee pain.  Patient reports pain in the gym with possible injury 1 month ago.  Reports swelling through the lateral knee as well as pain in the posterior medial joint.  Patient had previous dysfunction in the knee in November, and x-rays at that time showed early osteoarthritis.  Trialed on meloxicam, and has been utilizing meloxicam over the past 4 to 5 days.  Pain radiates through the medial compartment.  Difficulty with ambulation and higher impact activities.  Patient obtained MRI last month.\par \par The patient's past medical history, past surgical history, medications and allergies were reviewed by me today with the patient and documented accordingly. In addition, the patient's family and social history, which were noncontributory to this visit, were reviewed also.

## 2023-01-29 ENCOUNTER — EMERGENCY (EMERGENCY)
Facility: HOSPITAL | Age: 53
LOS: 1 days | Discharge: HOME | End: 2023-01-29
Attending: EMERGENCY MEDICINE | Admitting: EMERGENCY MEDICINE
Payer: COMMERCIAL

## 2023-01-29 VITALS
RESPIRATION RATE: 20 BRPM | OXYGEN SATURATION: 100 % | DIASTOLIC BLOOD PRESSURE: 119 MMHG | HEART RATE: 82 BPM | TEMPERATURE: 99 F | SYSTOLIC BLOOD PRESSURE: 186 MMHG

## 2023-01-29 DIAGNOSIS — Z98.89 OTHER SPECIFIED POSTPROCEDURAL STATES: Chronic | ICD-10-CM

## 2023-01-29 DIAGNOSIS — N97.9 FEMALE INFERTILITY, UNSPECIFIED: Chronic | ICD-10-CM

## 2023-01-29 PROCEDURE — 99285 EMERGENCY DEPT VISIT HI MDM: CPT

## 2023-01-29 NOTE — ED ADULT TRIAGE NOTE - CHIEF COMPLAINT QUOTE
Pt c/o chest pain since Thursday. Pain is primarily L sided, intermittent. Was seen at Urgent Care yesterday and was sent to [place] ED d/t hypertension. Also endorsing headache, RAMOS. PMHx Asthma

## 2023-01-30 VITALS
DIASTOLIC BLOOD PRESSURE: 88 MMHG | RESPIRATION RATE: 16 BRPM | OXYGEN SATURATION: 100 % | HEART RATE: 72 BPM | TEMPERATURE: 98 F | SYSTOLIC BLOOD PRESSURE: 155 MMHG

## 2023-01-30 LAB
ALBUMIN SERPL ELPH-MCNC: 4.2 G/DL — SIGNIFICANT CHANGE UP (ref 3.3–5)
ALP SERPL-CCNC: 60 U/L — SIGNIFICANT CHANGE UP (ref 40–120)
ALT FLD-CCNC: 8 U/L — SIGNIFICANT CHANGE UP (ref 4–33)
ANION GAP SERPL CALC-SCNC: 9 MMOL/L — SIGNIFICANT CHANGE UP (ref 7–14)
AST SERPL-CCNC: 19 U/L — SIGNIFICANT CHANGE UP (ref 4–32)
BASE EXCESS BLDV CALC-SCNC: 2.3 MMOL/L — SIGNIFICANT CHANGE UP (ref -2–3)
BASOPHILS # BLD AUTO: 0.02 K/UL — SIGNIFICANT CHANGE UP (ref 0–0.2)
BASOPHILS NFR BLD AUTO: 0.5 % — SIGNIFICANT CHANGE UP (ref 0–2)
BILIRUB SERPL-MCNC: 0.4 MG/DL — SIGNIFICANT CHANGE UP (ref 0.2–1.2)
BLOOD GAS VENOUS COMPREHENSIVE RESULT: SIGNIFICANT CHANGE UP
BUN SERPL-MCNC: 8 MG/DL — SIGNIFICANT CHANGE UP (ref 7–23)
CALCIUM SERPL-MCNC: 9.7 MG/DL — SIGNIFICANT CHANGE UP (ref 8.4–10.5)
CHLORIDE BLDV-SCNC: 102 MMOL/L — SIGNIFICANT CHANGE UP (ref 96–108)
CHLORIDE SERPL-SCNC: 102 MMOL/L — SIGNIFICANT CHANGE UP (ref 98–107)
CO2 BLDV-SCNC: 30.8 MMOL/L — HIGH (ref 22–26)
CO2 SERPL-SCNC: 27 MMOL/L — SIGNIFICANT CHANGE UP (ref 22–31)
CREAT SERPL-MCNC: 0.74 MG/DL — SIGNIFICANT CHANGE UP (ref 0.5–1.3)
EGFR: 97 ML/MIN/1.73M2 — SIGNIFICANT CHANGE UP
EOSINOPHIL # BLD AUTO: 0.1 K/UL — SIGNIFICANT CHANGE UP (ref 0–0.5)
EOSINOPHIL NFR BLD AUTO: 2.5 % — SIGNIFICANT CHANGE UP (ref 0–6)
FLUAV AG NPH QL: SIGNIFICANT CHANGE UP
FLUBV AG NPH QL: SIGNIFICANT CHANGE UP
GAS PNL BLDV: 136 MMOL/L — SIGNIFICANT CHANGE UP (ref 136–145)
GAS PNL BLDV: SIGNIFICANT CHANGE UP
GLUCOSE BLDV-MCNC: 102 MG/DL — HIGH (ref 70–99)
GLUCOSE SERPL-MCNC: 109 MG/DL — HIGH (ref 70–99)
HCO3 BLDV-SCNC: 29 MMOL/L — SIGNIFICANT CHANGE UP (ref 22–29)
HCT VFR BLD CALC: 36.7 % — SIGNIFICANT CHANGE UP (ref 34.5–45)
HCT VFR BLDA CALC: 38 % — SIGNIFICANT CHANGE UP (ref 34.5–46.5)
HGB BLD CALC-MCNC: 12.6 G/DL — SIGNIFICANT CHANGE UP (ref 11.5–15.5)
HGB BLD-MCNC: 12.1 G/DL — SIGNIFICANT CHANGE UP (ref 11.5–15.5)
IANC: 1.33 K/UL — LOW (ref 1.8–7.4)
IMM GRANULOCYTES NFR BLD AUTO: 0.2 % — SIGNIFICANT CHANGE UP (ref 0–0.9)
LACTATE BLDV-MCNC: 1.7 MMOL/L — SIGNIFICANT CHANGE UP (ref 0.5–2)
LYMPHOCYTES # BLD AUTO: 2.28 K/UL — SIGNIFICANT CHANGE UP (ref 1–3.3)
LYMPHOCYTES # BLD AUTO: 56.7 % — HIGH (ref 13–44)
MCHC RBC-ENTMCNC: 31.3 PG — SIGNIFICANT CHANGE UP (ref 27–34)
MCHC RBC-ENTMCNC: 33 GM/DL — SIGNIFICANT CHANGE UP (ref 32–36)
MCV RBC AUTO: 94.8 FL — SIGNIFICANT CHANGE UP (ref 80–100)
MONOCYTES # BLD AUTO: 0.28 K/UL — SIGNIFICANT CHANGE UP (ref 0–0.9)
MONOCYTES NFR BLD AUTO: 7 % — SIGNIFICANT CHANGE UP (ref 2–14)
NEUTROPHILS # BLD AUTO: 1.33 K/UL — LOW (ref 1.8–7.4)
NEUTROPHILS NFR BLD AUTO: 33.1 % — LOW (ref 43–77)
NRBC # BLD: 0 /100 WBCS — SIGNIFICANT CHANGE UP (ref 0–0)
NRBC # FLD: 0 K/UL — SIGNIFICANT CHANGE UP (ref 0–0)
NT-PROBNP SERPL-SCNC: 68 PG/ML — SIGNIFICANT CHANGE UP
PCO2 BLDV: 54 MMHG — HIGH (ref 39–42)
PH BLDV: 7.34 — SIGNIFICANT CHANGE UP (ref 7.32–7.43)
PLATELET # BLD AUTO: 217 K/UL — SIGNIFICANT CHANGE UP (ref 150–400)
PO2 BLDV: 32 MMHG — SIGNIFICANT CHANGE UP
POTASSIUM BLDV-SCNC: 4.8 MMOL/L — SIGNIFICANT CHANGE UP (ref 3.5–5.1)
POTASSIUM SERPL-MCNC: 4.3 MMOL/L — SIGNIFICANT CHANGE UP (ref 3.5–5.3)
POTASSIUM SERPL-SCNC: 4.3 MMOL/L — SIGNIFICANT CHANGE UP (ref 3.5–5.3)
PROT SERPL-MCNC: 7.4 G/DL — SIGNIFICANT CHANGE UP (ref 6–8.3)
RBC # BLD: 3.87 M/UL — SIGNIFICANT CHANGE UP (ref 3.8–5.2)
RBC # FLD: 12.6 % — SIGNIFICANT CHANGE UP (ref 10.3–14.5)
RSV RNA NPH QL NAA+NON-PROBE: SIGNIFICANT CHANGE UP
SAO2 % BLDV: 42.9 % — SIGNIFICANT CHANGE UP
SARS-COV-2 RNA SPEC QL NAA+PROBE: SIGNIFICANT CHANGE UP
SODIUM SERPL-SCNC: 138 MMOL/L — SIGNIFICANT CHANGE UP (ref 135–145)
TROPONIN T, HIGH SENSITIVITY RESULT: <6 NG/L — SIGNIFICANT CHANGE UP
WBC # BLD: 4.02 K/UL — SIGNIFICANT CHANGE UP (ref 3.8–10.5)
WBC # FLD AUTO: 4.02 K/UL — SIGNIFICANT CHANGE UP (ref 3.8–10.5)

## 2023-01-30 PROCEDURE — 71046 X-RAY EXAM CHEST 2 VIEWS: CPT | Mod: 26

## 2023-01-30 RX ORDER — IBUPROFEN 200 MG
600 TABLET ORAL ONCE
Refills: 0 | Status: COMPLETED | OUTPATIENT
Start: 2023-01-30 | End: 2023-01-30

## 2023-01-30 RX ADMIN — Medication 600 MILLIGRAM(S): at 01:14

## 2023-01-30 NOTE — ED PROVIDER NOTE - OBJECTIVE STATEMENT
51yo F No PMH presenting to emergency department with 3 to 4 days of left-sided chest pain.  Chest pain is achy, constant, worse with exertion associated with mild shortness of breath.  Patient has never had similar symptoms in the past, no family history of early heart disease.  No recent long traveling, lower extremity swelling calf tenderness, new medications or trauma.    Was seen yesterday in urgent care for the same issue and referred to [place] emergency department where patient had negative cardiac enzymes, D-dimer, chest x-ray, results present at bedside.

## 2023-01-30 NOTE — ED PROVIDER NOTE - NSFOLLOWUPINSTRUCTIONS_ED_ALL_ED_FT
You are seen the emergency department today for chest pain likely caused by costochondritis.  This is a condition in which you have inflammation of the cartilage attached to your sternum which causes severe pain.  The treatment for this is ibuprofen and Tylenol.    Take 500-1000mg acetaminophen (tylenol) every 6-8 hours as needed  Take 400-600mg ibuprofen (advil or motrin) every 6-8 hours as needed, take with food    Please follow-up with your primary care doctor within 1 to 2 weeks    Please return to the Emergency Department should you experience any of the following:  - Chest pain, Palpitations, Painful breathing  - Worsening or persistent shortness of breath  - Fever, unexplained weight loss, night sweats  - Blood in stool or in urine  - New weakness, fatigue, or fainting  - Any new concerning symptoms

## 2023-01-30 NOTE — ED ADULT NURSE NOTE - CHIEF COMPLAINT QUOTE
Pt c/o chest pain since Thursday. Pain is primarily L sided, intermittent. Was seen at Urgent Care yesterday and was sent to [place] ED d/t hypertension. Also endorsing headache, RAMOS. PMHx Asthma

## 2023-01-30 NOTE — ED PROVIDER NOTE - PRINCIPAL DIAGNOSIS
Responded to parent in portal    ----- Message from Dejah Toro sent at 2023 11:31 AM CDT -----  Contact: Mom - [phone]  Would like to receive medical advice.  Symptoms (please be specific):  Child's poop is grey and white but the consistency of it is normal  How long has the patient had these symptoms:  Today     Would they like a call back or a response via [facility]: Portal   Additional information:    Mom states she was changing pt's diaper and besides the color is looked normal. This was the onlt diaper so far where the color is different. Mom would like advice on what she should do.            
Acute chest pain

## 2023-01-30 NOTE — ED ADULT NURSE NOTE - OBJECTIVE STATEMENT
isatu rn: pt rcd to trauma [facility] a&ox4 ambulatory c/o intermittent left sided chest pain since thursday. pt states was "seen at [facility] and got chest XR, labs and EKG and was discharged". pt c/o HTN, headache, and RAMOS. pt has no neuro or sensory deficits. no vision changes, no photophobia. pt no numbness or tingling to extremities. abdomen soft and nondistended. pt denies sob, nausea, vomiting, dizziness, fevers/chills, urinary symptoms at this time. pt pmhx asthma. pt EKG completed. pt normal sinus on monitor. pt respirations even and unlabored with no accessory muscle use. 20g to the LAC. labs collected and sent. pt medicated as per md order. pt in NAD and stable pending XR at this time. pt safety maintained.

## 2023-01-30 NOTE — ED PROVIDER NOTE - PATIENT PORTAL LINK FT
You can access the FollowMyHealth Patient Portal offered by [facility] by registering at the following website: [email or web address]. By joining [employer]’s FollowMyHealth portal, you will also be able to view your health information using other applications (apps) compatible with our system.

## 2023-01-30 NOTE — ED PROVIDER NOTE - ATTENDING CONTRIBUTION TO CARE
52-year-old female non-smoker with no past medical history reported, no family history of cardiac disease under age 50 years, presenting to the ED today for 4 to 5 days of intermittent left-sided chest pains with shortness of breath.  Of note patient was seen at urgent care for this earlier in the week, went to [facility] ED, had chest x-ray, comprehensive labs with troponin and D-dimer, which were negative.  Patient comes to our ED for a reevaluation as she was having worsening pain today.  Patient says pain is pleuritic, nonradiating, not related to eating, and is worse with deep touch and movement of left shoulder    EXAM  Lungs clear bilaterally  Tenderness to the left anterior ribs at sternal border  No peripheral edema    EKG normal sinus rhythm with no acute ischemic changes    Assessment/plan  Nonspecific chest pain–appears MSK in origin based on reproducibility with palpation and movement  Patient already had comprehensive labs with D- dimer here in ED (patient is low risk Wells score)  Will repeat CXR and labs with troponin to eval for ACS  Trial of NSAIDs  Dispo pending results and reevaluation of symptoms.

## 2023-01-30 NOTE — ED PROVIDER NOTE - PROGRESS NOTE DETAILS
Antonio Shook, PGY-3: Pt reexamined at bedside, resting comfortably, vitals stable. Pain is improved after NSAIDs, metabolic panel within normal limits, troponin BNP negative.  Deferred D-dimer as patient had negative dimer yesterday with no risk factors or signs of PE or DVT.  Blood gas is normal and chest x-ray is unremarkable.  Given pain is reproducible on exam and parasternal with negative work-up will treat as costochondritis.  Patient informed about this condition and recommended to follow-up with primary care doctor.  Agreeable with decision will discharge at this time

## 2023-01-30 NOTE — ED ADULT NURSE NOTE - TEMPLATE
Your Child's Health  Six-Month-Old Visit      Shay Dan  November 2, 2018    Visit Vitals  Pulse 124   Temp 97.8 °F (36.6 °C) (Temporal Artery)   Resp 32   Ht 28.5\" (72.4 cm)   Wt 8.335 kg   HC 45.5 cm (17.91\")   BMI 15.91 kg/m²     Weight: 18.38 lbs    NUTRITION:  In the next few months, Shay can adopt a feeding schedule closer to yours.  While you may not choose to feed him at the table, having Shay sit with you will help him become accustomed to family meals.  Babies are ready for finger foods at about the time that they can sit alone (7-8 months) and start to use their index finger separately from their other fingers (about 9 months).  Much will depend on the baby's temperament.  Some babies are \"nibblers\" and do well with finger foods early; others push the food into their mouths to the point of gagging.  If you try finger foods and your baby has a problem with them, wait a week or so before trying again.  Start with finger foods that will become soft with saliva - for example, teething biscuits, cereal pieces, etc.  Teeth are not necessary at this point.       Because we have less sunlight in our part of the country, infants whose only source of milk is mother's milk should have Vitamin D supplements (available without prescription at the drug store) each day.      We no longer consider juice a health food.  When fruits were not available, it was a nutritional help, but fruits are much better nutritionally than juice. Amounts of juice over 4 oz. per day are associated with an increased risk of obesity.    Avoid using food, especially sweets, to keep Shay from crying.    FLUORIDE: Shay will begin depositing enamel on the permanent teeth at this age.  Ask your pediatrician or nurse whether fluoride supplements are advisable if you have not already discussed this.    DEVELOPMENT/BEHAVIOR:  Over the next few months, many babies will push up on outstretched arms, and they will then go up on  their hands and knees.  After rocking back and forth for a few weeks, they may start crawling.  Some will try to pull up to a standing position.   Many babies, however, do not crawl until after walking.  Single consonant sounds (such as \"da,\" \"ma,\" or \"ba\") will lead to repetitive sounds (\"jaime,\" \"mama,\" or \"baba\").  At this age, babies grab everything, and it all goes into their mouths; so you should be careful to protect your baby from choking and poisoning.  Most babies at this age can sleep all night, often ten to twelve hours.  Most babies take at least one nap a day, but some don't nap at all.    ACCIDENT PREVENTION:  1.  Falls:  Use sanabria on stairways and at the entrances to rooms that cannot be child-proofed.  2.  Scalding:  Adjust your hot water heater temperature to 120-130 degrees F.  Guard hot radiators or vents.  3.  Walkers:  These have been associated with a variety of injuries.  Stationary saucers and/or jumpers are safer.  4.  Electricity:  Protect Shay from access to electrical cords and outlets. Use safety plugs.  5.  Poisoning:  Remove the following items from reach or place them in a locked cabinet:     Cleaning products     Kerosene (lamp oil)     Paint     Pesticides     Purses (which sometimes contain medicines)     Plants      Medicines, including vitamins and birth control pills         Use safety caps on medicines.  Household  and polishes must be kept out of reach. Store medicines and  out of sight.  Don't transfer medicines to non-child-proofed or unlabeled containers. Dispose of unused medications. Be especially careful when visiting older persons or families without children in the house.  They may be in the habit of keeping their medicines out on tables.       Syrup of Ipecac is no longer recommended to be kept in the home. Please dispose of any remaining supplies.       Call the Poison Center at 1-653.222.6085 for any known or suspected poisoning.     CAR SAFETY:   An approved rear facing car seat in the back seat of the car is required by [place] law until Shay is two years old.  A rear-facing car seat in the back seat is the safest place for your baby. This is especially true if your car is equipped with passenger-side air bags.  Forward-facing seats are not recommended until your child is over two years of age.    SMOKING:  Children exposed to tobacco smoke have more ear infections and pneumonia.  If you smoke, please quit.  If you cannot quit, smoke outside.  Do not smoke near Shay, do not smoke in the car and do not let others smoke near him.    SUN EXPOSURE:  If you plan to have Shay outside for more than 30 minutes, please use sun screen.    TEETHING:  Teething children may have no symptoms at all, or they may experience drooling, rashes, crabbiness, or changes in diet.  Do not assume that a fever is due to teething.  Temperatures over 101 degrees are usually from some other cause.  Once the teeth erupt, you should begin cleaning Shay’s teeth with a washcloth, gauze, or soft toothbrush.  Toothpaste is not necessary yet.    LEAD EXPOSURE:  Shay will be more mobile in the next few months.  If there is lead in the house, he may be vulnerable.  Let us know if any of the following apply:  1.  Your home was built before 1978 and has peeling or chipping or chalking paint. Homes built in older cities at the turn of the last century may have lead pipes. Check to see if any of the above applies to Shay's sitter's home, , , or any other house where he spends time.  2.  You have another child or housemate who is being followed or treated for an elevated lead level.  3.  Shay lives with an adult whose job or hobby involves lead exposure (soldering, battery manufacture, recycling, casting, stained glass, etc.).  4.  You live near an active lead smelter, a battery recycling plant, or a plant that processes hot metal.    DROWNING:  Never leave infants  alone in or near water.    MEDICATION FOR FEVER OR PAIN:   Acetaminophen liquid (e.g., Tylenol or Tempra) may be given every four hours as needed for pain or fever.      INFANT/CHILDREN’S Tylenol/Acetaminophen  (160 MG/5 mL)  Child’s Weight: Dose:  12 - 17 pounds:   80 mg (2.5 mL  (1/2 Teaspoon))  18 - 23 pounds:   120 mg (3.75 mL (3/4 Teaspoon))    Beginning at 6 months of age, Children's Ibuprofen (e.g., Advil or Motrin) may be given every six hours as needed for pain or fever.    Child’s Weight: Dose:  12 - 17 pounds:   60 - 80 mg (3.0 - 4.0 mL (3/5 - 4/5 Teaspoon))  18 - 23 pounds:   80 - 100 mg (4.0 - 5.0 mL (4/5 - 1 Teaspoon))    If Shay is outside these weight ranges, call your pediatrician's office for advice.     Most Recent Immunizations   Administered Date(s) Administered   • DTaP/Hep B/IPV 2017   • HIB, Unspecified Formulation 2017   • Hep B, adolescent or pediatric 2017   • Pneumococcal Conjugate 13 valent 2017   • Rotavirus - pentavalent 2017   Pended Date(s) Pended   • DTaP/Hep B/IPV 2017   • Pneumococcal Conjugate 13 valent 2017   • Rotavirus - pentavalent 2017       If Shay develops any of the following reactions within 72 hours after an immunization, notify your pediatrician by calling the pediatric phone nurse:  1.  A temperature of 105 degrees or above.  2.  More than 3 hours of continuous crying.  3.  A shrill, high-pitched cry.  4.  A pale, limp spell.  5.  A seizure or fainting spell.  In this case, you should call 911 or go immediately to the emergency room.      NEXT VISIT: NINE MONTHS OF AGE    Thank you for entrusting your care to [facility].   General

## 2023-01-30 NOTE — ED PROVIDER NOTE - PHYSICAL EXAMINATION
GENERAL: non-toxic appearing, alert, in NAD  HEENT: atraumatic, normocephalic, Vision grossly intact, no conjunctivitis or discharge, hearing grossly intact,  CARDIAC: Well perfused peripherally, warm extremities, pulses 2+ b/l upper extremities  PULM: normal work of breathing, oxygen saturation on RA wnl, no audible wheeze or stridor   GI: abdomen nondistended, soft, nontender, no guarding or rebound tenderness, no palpable masses  NEURO: awake and alert, follows commands, normal speech, PERRLA, EOMI, no focal motor or sensory deficits  MSK: spine appears normal, no joint swelling or erythema, ranging all extremities with no appreciable loss of ROM  EXT: no peripheral edema  SKIN: warm, dry, and intact, no rashes  PSYCH: appropriate mood and affect

## 2023-01-30 NOTE — ED PROVIDER NOTE - CLINICAL SUMMARY MEDICAL DECISION MAKING FREE TEXT BOX
52-year-old female no past medical history heart score of 1 for age presents emergency department with left-sided chest pain for 3 days with negative previous cardiac work-up.  No prior stress tests however no risk for ACS at this time.  EKG is normal sinus nonischemic.  Previous D-dimer yesterday with no signs of peripheral edema will defer CTA or repeat dimer at this time.  Physical exam remarkable for normal cardiac exam normal lungs and good peripheral perfusion.  Has parasternal left-sided reproducible tenderness likely representative costochondritis.  We will treat with NSAIDs repeat cardiac enzymes get chest x-ray.  If work-up negative and negative will have follow-up outpatient with PCP

## 2023-02-08 ENCOUNTER — EMERGENCY (EMERGENCY)
Facility: HOSPITAL | Age: 53
LOS: 1 days | Discharge: HOME | End: 2023-02-08
Attending: STUDENT IN AN ORGANIZED HEALTH CARE EDUCATION/TRAINING PROGRAM | Admitting: STUDENT IN AN ORGANIZED HEALTH CARE EDUCATION/TRAINING PROGRAM
Payer: COMMERCIAL

## 2023-02-08 VITALS
WEIGHT: 169.98 LBS | HEIGHT: 64 IN | SYSTOLIC BLOOD PRESSURE: 185 MMHG | OXYGEN SATURATION: 100 % | TEMPERATURE: 98 F | RESPIRATION RATE: 18 BRPM | DIASTOLIC BLOOD PRESSURE: 108 MMHG | HEART RATE: 81 BPM

## 2023-02-08 DIAGNOSIS — Z98.89 OTHER SPECIFIED POSTPROCEDURAL STATES: Chronic | ICD-10-CM

## 2023-02-08 DIAGNOSIS — N97.9 FEMALE INFERTILITY, UNSPECIFIED: Chronic | ICD-10-CM

## 2023-02-08 LAB
ALBUMIN SERPL ELPH-MCNC: 4.9 G/DL — SIGNIFICANT CHANGE UP (ref 3.3–5)
ALP SERPL-CCNC: 68 U/L — SIGNIFICANT CHANGE UP (ref 40–120)
ALT FLD-CCNC: 12 U/L — SIGNIFICANT CHANGE UP (ref 4–33)
ANION GAP SERPL CALC-SCNC: 12 MMOL/L — SIGNIFICANT CHANGE UP (ref 7–14)
APAP SERPL-MCNC: <10 UG/ML — LOW (ref 15–25)
APTT BLD: 32.8 SEC — SIGNIFICANT CHANGE UP (ref 27–36.3)
AST SERPL-CCNC: 16 U/L — SIGNIFICANT CHANGE UP (ref 4–32)
BASE EXCESS BLDV CALC-SCNC: 1.7 MMOL/L — SIGNIFICANT CHANGE UP (ref -2–3)
BASOPHILS # BLD AUTO: 0.03 K/UL — SIGNIFICANT CHANGE UP (ref 0–0.2)
BASOPHILS NFR BLD AUTO: 0.5 % — SIGNIFICANT CHANGE UP (ref 0–2)
BILIRUB SERPL-MCNC: 0.7 MG/DL — SIGNIFICANT CHANGE UP (ref 0.2–1.2)
BLOOD GAS VENOUS COMPREHENSIVE RESULT: SIGNIFICANT CHANGE UP
BUN SERPL-MCNC: 6 MG/DL — LOW (ref 7–23)
CALCIUM SERPL-MCNC: 10.2 MG/DL — SIGNIFICANT CHANGE UP (ref 8.4–10.5)
CHLORIDE BLDV-SCNC: 102 MMOL/L — SIGNIFICANT CHANGE UP (ref 96–108)
CHLORIDE SERPL-SCNC: 101 MMOL/L — SIGNIFICANT CHANGE UP (ref 98–107)
CO2 BLDV-SCNC: 27.9 MMOL/L — HIGH (ref 22–26)
CO2 SERPL-SCNC: 24 MMOL/L — SIGNIFICANT CHANGE UP (ref 22–31)
CREAT SERPL-MCNC: 0.72 MG/DL — SIGNIFICANT CHANGE UP (ref 0.5–1.3)
EGFR: 100 ML/MIN/1.73M2 — SIGNIFICANT CHANGE UP
EOSINOPHIL # BLD AUTO: 0.13 K/UL — SIGNIFICANT CHANGE UP (ref 0–0.5)
EOSINOPHIL NFR BLD AUTO: 2.4 % — SIGNIFICANT CHANGE UP (ref 0–6)
ETHANOL SERPL-MCNC: <10 MG/DL — SIGNIFICANT CHANGE UP
GAS PNL BLDV: 134 MMOL/L — LOW (ref 136–145)
GLUCOSE BLDV-MCNC: 103 MG/DL — HIGH (ref 70–99)
GLUCOSE SERPL-MCNC: 107 MG/DL — HIGH (ref 70–99)
HCG SERPL-ACNC: <5 MIU/ML — SIGNIFICANT CHANGE UP
HCO3 BLDV-SCNC: 27 MMOL/L — SIGNIFICANT CHANGE UP (ref 22–29)
HCT VFR BLD CALC: 32.4 % — LOW (ref 34.5–45)
HCT VFR BLDA CALC: 42 % — SIGNIFICANT CHANGE UP (ref 34.5–46.5)
HGB BLD CALC-MCNC: 14 G/DL — SIGNIFICANT CHANGE UP (ref 11.5–15.5)
HGB BLD-MCNC: 11.1 G/DL — LOW (ref 11.5–15.5)
HIV 1+2 AB+HIV1 P24 AG SERPL QL IA: SIGNIFICANT CHANGE UP
IANC: 1.92 K/UL — SIGNIFICANT CHANGE UP (ref 1.8–7.4)
IMM GRANULOCYTES NFR BLD AUTO: 0 % — SIGNIFICANT CHANGE UP (ref 0–0.9)
INR BLD: 1.15 RATIO — SIGNIFICANT CHANGE UP (ref 0.88–1.16)
LACTATE BLDV-MCNC: 1.4 MMOL/L — SIGNIFICANT CHANGE UP (ref 0.5–2)
LYMPHOCYTES # BLD AUTO: 3.11 K/UL — SIGNIFICANT CHANGE UP (ref 1–3.3)
LYMPHOCYTES # BLD AUTO: 56.3 % — HIGH (ref 13–44)
MCHC RBC-ENTMCNC: 31.8 PG — SIGNIFICANT CHANGE UP (ref 27–34)
MCHC RBC-ENTMCNC: 34.3 GM/DL — SIGNIFICANT CHANGE UP (ref 32–36)
MCV RBC AUTO: 92.8 FL — SIGNIFICANT CHANGE UP (ref 80–100)
MONOCYTES # BLD AUTO: 0.33 K/UL — SIGNIFICANT CHANGE UP (ref 0–0.9)
MONOCYTES NFR BLD AUTO: 6 % — SIGNIFICANT CHANGE UP (ref 2–14)
NEUTROPHILS # BLD AUTO: 1.92 K/UL — SIGNIFICANT CHANGE UP (ref 1.8–7.4)
NEUTROPHILS NFR BLD AUTO: 34.8 % — LOW (ref 43–77)
NRBC # BLD: 0 /100 WBCS — SIGNIFICANT CHANGE UP (ref 0–0)
NRBC # FLD: 0 K/UL — SIGNIFICANT CHANGE UP (ref 0–0)
PCO2 BLDV: 42 MMHG — SIGNIFICANT CHANGE UP (ref 39–42)
PH BLDV: 7.41 — SIGNIFICANT CHANGE UP (ref 7.32–7.43)
PLATELET # BLD AUTO: 196 K/UL — SIGNIFICANT CHANGE UP (ref 150–400)
PO2 BLDV: 42 MMHG — SIGNIFICANT CHANGE UP
POTASSIUM BLDV-SCNC: 3.1 MMOL/L — LOW (ref 3.5–5.1)
POTASSIUM SERPL-MCNC: 3.3 MMOL/L — LOW (ref 3.5–5.3)
POTASSIUM SERPL-SCNC: 3.3 MMOL/L — LOW (ref 3.5–5.3)
PROT SERPL-MCNC: 8.3 G/DL — SIGNIFICANT CHANGE UP (ref 6–8.3)
PROTHROM AB SERPL-ACNC: 13.4 SEC — SIGNIFICANT CHANGE UP (ref 10.5–13.4)
RBC # BLD: 3.49 M/UL — LOW (ref 3.8–5.2)
RBC # FLD: 12.4 % — SIGNIFICANT CHANGE UP (ref 10.3–14.5)
SALICYLATES SERPL-MCNC: <0.3 MG/DL — LOW (ref 15–30)
SAO2 % BLDV: 73.7 % — SIGNIFICANT CHANGE UP
SARS-COV-2 RNA SPEC QL NAA+PROBE: SIGNIFICANT CHANGE UP
SODIUM SERPL-SCNC: 137 MMOL/L — SIGNIFICANT CHANGE UP (ref 135–145)
TOXICOLOGY SCREEN, DRUGS OF ABUSE, SERUM RESULT: SIGNIFICANT CHANGE UP
TROPONIN T, HIGH SENSITIVITY RESULT: <6 NG/L — SIGNIFICANT CHANGE UP
WBC # BLD: 5.52 K/UL — SIGNIFICANT CHANGE UP (ref 3.8–10.5)
WBC # FLD AUTO: 5.52 K/UL — SIGNIFICANT CHANGE UP (ref 3.8–10.5)

## 2023-02-08 PROCEDURE — 99291 CRITICAL CARE FIRST HOUR: CPT

## 2023-02-08 PROCEDURE — 0042T: CPT | Mod: MA

## 2023-02-08 PROCEDURE — 70498 CT ANGIOGRAPHY NECK: CPT | Mod: 26,MA

## 2023-02-08 PROCEDURE — 70496 CT ANGIOGRAPHY HEAD: CPT | Mod: 26,MA

## 2023-02-08 RX ORDER — ACETAMINOPHEN 500 MG
975 TABLET ORAL ONCE
Refills: 0 | Status: COMPLETED | OUTPATIENT
Start: 2023-02-08 | End: 2023-02-08

## 2023-02-08 RX ORDER — METOCLOPRAMIDE HCL 10 MG
10 TABLET ORAL ONCE
Refills: 0 | Status: COMPLETED | OUTPATIENT
Start: 2023-02-08 | End: 2023-02-08

## 2023-02-08 RX ORDER — SODIUM CHLORIDE 9 MG/ML
1000 INJECTION INTRAMUSCULAR; INTRAVENOUS; SUBCUTANEOUS ONCE
Refills: 0 | Status: COMPLETED | OUTPATIENT
Start: 2023-02-08 | End: 2023-02-08

## 2023-02-08 RX ADMIN — Medication 975 MILLIGRAM(S): at 23:57

## 2023-02-08 RX ADMIN — Medication 10 MILLIGRAM(S): at 23:58

## 2023-02-08 RX ADMIN — Medication 0.5 MILLIGRAM(S): at 21:10

## 2023-02-08 NOTE — CONSULT NOTE ADULT - SUBJECTIVE AND OBJECTIVE BOX
Neurology Consultation     HPI: Patient GERDA is a 53yo F PMHx HTN presents to ED for constellation of symptoms for which code stroke called. Patient presented ~1 week ago for chest pain and had cardiac w/u performed and concern for costochondritis. Today LKW 7PM 23 and stated she began to develop posterior (parieto-occipital) headache, R facial numbness, L arm pain, lightheadedness, for which she came to ED as code stroke. Otherwise denies blurry/double vision, nausea, vomiting, dizziness, focal extremity weakness. ED called emergency contact Deuce Juárez who states patient lives alone, unable to provide additional history.      NIHSS:   preMRS: 0    PAST MEDICAL & SURGICAL HISTORY:  Asthma  last episode     Knee injury  right    Tubal occlusion  fallopian tubes    Infertility, female    H/O  section      H/O arthroscopy of right knee    Female infertility  s/p oocyte retrieval x 4    H/O abdominoplasty      FAMILY HISTORY:  Family history of hypertension (Grandparent)    SOCIAL HISTORY:      MEDICATIONS (HOME):  Home Medications:    MEDICATIONS  (STANDING):  LORazepam   Injectable 1 milliGRAM(s) IV Push once    MEDICATIONS  (PRN):    ALLERGIES/INTOLERANCES:  Allergies  penicillin (Other)    Intolerances    VITALS & EXAMINATION:  Vital Signs Last 24 Hrs  T(C): 36.9 (2023 20:21), Max: 36.9 (2023 20:21)  T(F): 98.5 (2023 20:21), Max: 98.5 (2023 20:21)  HR: 81 (2023 20:21) (81 - 81)  BP: 185/108 (2023 20:21) (185/108 - 185/108)  BP(mean): --  RR: 18 (2023 20:21) (18 - 18)  SpO2: 100% (2023 20:21) (100% - 100%)    Parameters below as of 2023 20:21  Patient On (Oxygen Delivery Method): room air       General:  Constitutional: Female, appears stated age, very emotional, crying   Head: Normocephalic; Eyes: clear sclera;   Extremities: No cyanosis;   Resp: breathing comfortably     Neurological (>12):  MS: Awake, alert. Oriented person place situation. Follows commands. Attends to examiner. Able to ID 3/3 objects.   Language: Speech is hypophonic, increased speech latency, saying words that are drawn out. No clear dysarthria appreciated, otherwise good repetition,  comprehension, registration of words.  CNs: PERRL. VFF but takes time to answer all quadrants. EOMI no nystagmus. V1-3 says different on R side but unable to state if numb, No alberto facial asymmetry b/l. Hearing grossly normal b/l. Tongue midline.     Motor - Normal bulk and tone throughout. No pronator drift.    L/R         Deltoid  5/5    Biceps   5/5      Triceps  5/5         Wrist Extension 5/5   Wrist Flexion  5/5      5/5   L/R         Hip Flexion  5/5    Knee Extension  5/5  Dorsiflexion  5/5      Plantar Flexion 5/5     Sensation: Intact to LT b/l of all extremities. Cortical: Extinction on DSS (neglect): none  Reflexes L/R:  Biceps(C5) 2/2  BR(C6) 2/2   Triceps(C7)  2/2 Patellar(L4)   2/2   Toes: mute  Coordination: No dysmetria to FTN b/l UE    Gait:        LABORATORY:  CBC   Chem       LFTs   Coagulopathy   Lipid Panel   A1c   Cardiac enzymes     U/A   CSF  Other    STUDIES & IMAGING: (EEG, CT, MR, U/S, TTE/IZABELA): Neurology Consultation     HPI: Patient GERDA is a 51yo F PMHx HTN presents to ED for constellation of symptoms for which code stroke called. Patient presented ~1 week ago for chest pain and had cardiac w/u performed and concern for costochondritis. Today LKW 7PM 23 and stated she began to develop posterior (parieto-occipital) headache, mild R facial numbness, shoulder to mid L arm pain, lightheadedness, for which she came to ED as code stroke. Otherwise denies blurry/double vision, nausea, vomiting, dizziness, focal extremity weakness. Symptoms make it difficult for her to ambulate but not falling towards one side. ED called emergency contact Deuce Juárez who states patient lives alone, unable to provide additional history. In ED code stroke called, CTA, CTP, CTH obtained and without evidence of hemorrhage, territorial acute/subacute infarct, large vessel occlusion. No core/penumbra. Discussed patient case in detail and had patient on [employer] video (telestroke cart not loading) with telestroke attending Dr Bledsoe and re-examined patient and deemed patient with minimal non-disabling deficits that would be explained by a potential stroke if present. Therefore, not currently candidate for tenecteplase administration.      NIHSS: 1   preMRS: 0    PAST MEDICAL & SURGICAL HISTORY:  Asthma  last episode     Knee injury  right    Tubal occlusion  fallopian tubes    Infertility, female    H/O  section      H/O arthroscopy of right knee    Female infertility  s/p oocyte retrieval x 4    H/O abdominoplasty      FAMILY HISTORY:  Family history of hypertension (Grandparent)    SOCIAL HISTORY:      MEDICATIONS (HOME):  Home Medications:    MEDICATIONS  (STANDING):  LORazepam   Injectable 1 milliGRAM(s) IV Push once    MEDICATIONS  (PRN):    ALLERGIES/INTOLERANCES:  Allergies  penicillin (Other)    Intolerances    VITALS & EXAMINATION:  Vital Signs Last 24 Hrs  T(C): 36.9 (2023 20:21), Max: 36.9 (2023 20:21)  T(F): 98.5 (2023 20:21), Max: 98.5 (2023 20:21)  HR: 81 (2023 20:21) (81 - 81)  BP: 185/108 (2023 20:21) (185/108 - 185/108)  BP(mean): --  RR: 18 (2023 20:21) (18 - 18)  SpO2: 100% (2023 20:21) (100% - 100%)    Parameters below as of 2023 20:21  Patient On (Oxygen Delivery Method): room air       General:  Constitutional: Female, appears stated age, very emotional, crying   Head: Normocephalic; Eyes: clear sclera;   Extremities: No cyanosis;   Resp: breathing comfortably     Neurological (>12):  MS: Awake, alert. Oriented person place situation. Follows commands. Attends to examiner. Able to ID 3/3 objects.   Language: Speech is hypophonic, increased speech latency, saying words that are drawn out. No clear dysarthria appreciated, otherwise good repetition,  comprehension, registration of words.  CNs: PERRL. VFF but takes time to answer all quadrants. EOMI no nystagmus. V1-3 says different on R side but unable to state if numb initially (takes multiple LT examinations to determine if asymmetry in sensation), No alberto facial asymmetry b/l. Hearing grossly normal b/l. Tongue midline.     Motor - Normal bulk and tone throughout. No pronator drift. Effort dependent.    L/R         Deltoid  5/5    Biceps   5/5      Triceps  5/5         Wrist Extension 5/5   Wrist Flexion  5/5      5/5   L/R         Hip Flexion  5/5    Knee Extension  5/5  Dorsiflexion  5/5      Plantar Flexion 5/5     Sensation: Intact to LT b/l of all extremities. Cortical: Extinction on DSS (neglect): none   Toes: mute  No ankle clonus  Coordination: Possible very subtle LUE dysmetria. Able to perform HTS bilaterally  Gait: able to ambulate independently but very cautious, increased slowing but not falling towards one side.     LABORATORY:                        11.1   5.52  )-----------( 196      ( 2023 20:45 )             32.4   02-08    137  |  101  |  6<L>  ----------------------------<  107<H>  3.3<L>   |  24  |  0.72    Ca    10.2      2023 20:45    TPro  8.3  /  Alb  4.9  /  TBili  0.7  /  DBili  x   /  AST  16  /  ALT  12  /  AlkPhos  68  -    LFTs   Coagulopathy   Lipid Panel   A1c   Cardiac enzymes     U/A   CSF  Other    STUDIES & IMAGING: (EEG, CT, MR, U/S, TTE/IZABELA):    < from: CT Brain Stroke Protocol (23 @ 21:02) >    ACC: [id number] EXAM:  CT BRAIN STROKE PROTOCOL   ORDERED BY: CLARISSA PORTER     PROCEDURE DATE:  2023          INTERPRETATION:  CLINICAL INFORMATION: Abnormal sensation on the right   face. Pain left side body. Headache, dizziness. Last known normal 7 PM   today.    TECHNIQUE:  Noncontrast CT of the head was performed.  Coronal and sagittal reformatted images were subsequently obtained.    COMPARISON: None.    FINDINGS:    There is no evidence of mass effect, midline shift, acute intracranial   hemorrhage, or extra-axial collections.  Mild patchy hypodensities within   the periventricular and subcortical white matter, although nonspecific,   likely reflect chronic microvascular disease.    The ventricular and sulcal size and configuration is age appropriate.    The calvarium is intact. The paranasal sinuses are clear. The mastoid air   cells are predominantly clear. The orbits are unremarkable. Leftward   gaze, nonspecific.    IMPRESSION:    No acute intra-cranial hemorrhage, mass effect, or CT evidence of acute   vascular territorial infarction. If clinical symptoms persist or worsen,   more sensitive evaluation with brain MRI may be obtained, if no   contraindications exist.    Discussed with Dr. Montoya by Dr. Moon on 2023 at 8:54 PM with   readback confirmation.    --- End of Report ---    ADELINE MOON MD; Resident Radiologist  This document has been electronically signed.  DEISY HILLIARD MD; Attending Radiologist  This document has been electronically signed.2023  9:08PM    < end of copied text >    < from: CT Angio Brain Stroke Protocol  w/ IV Cont (23 @ 21:02) >    ACC: [id number] EXAM:  CT ANGIO NECK STROKE PROTCL IC   ORDERED BY: CLARISSA PORTER     ACC: [id number] EXAM:  CT BRAIN PERFUSION MAPS STROKE   ORDERED BY: CLARISSA PORTER     ACC: [id number] EXAM:  CT ANGIO BRAIN STROKE PROTC IC   ORDERED BY: CLARISSA PORTER     PROCEDURE DATE:  2023          INTERPRETATION:  HISTORY:  Sensation on the right face. Pain left side   body. Headache, dizziness. Last known normal 7 PM today.    COMPARISON: None    TECHNIQUE: Perfusion CT through the brain obtained during separate bolus   injection of intravenous contrast.  Processing of the CT perfusion data,   specifically, maps of mean transit time, cerebral blood flow and cerebral   blood volume were generated using iSchemaView RAPID.    CT angiogram of the neck and brain was performed after administration of   intravenous contrast. MIP and 3D reconstructions were performed.    Total of 150 cc Omnipaque 350 intravenous contrast were administered   without complication. 0 cc intravenous contrast discarded.    FINDINGS:    CT PERFUSION:    CBF <30%: 0 ml  Tmax > 6s: 0 ml  Mismatch volume=  0 ml  Mismatch ratio: None    At the imaged levels, normal mean transit time, cerebral blood flow, and   cerebral blood volume are demonstrated bilaterally. Thereis no evidence   of irreversible, potentially reversible, or compensated perfusion   abnormality within these portions of the brain.    CTA BRAIN    INTERNAL CAROTID ARTERIES:  The intracranial segments of the ICA are   patent without hemodynamicallysignificant stenosis, occlusion, or   aneurysm. The ICA bifurcations are unremarkable.    ANTERIOR CEREBRAL ARTERIES: No flow-limiting stenosis or occlusion.   Anterior communicating artery is poorly visualized.    MIDDLE CEREBRAL ARTERIES: No flow-limiting stenosis or occlusion. MCA   bifurcations are unremarkable without aneurysm.    POSTERIOR CEREBRAL ARTERIES: No flow-limiting stenosis or occlusion.   Posterior communicating arteries visualized on the right and poorly   visualized on the left.    VERTEBROBASILAR SYSTEM: No flow-limiting stenosis or occlusion. Basilar   tip is unremarkable.    CTA NECK    RIGHT CAROTID SYSTEM: Normal in course and caliber without flow-limiting   stenosis or occlusion.    LEFT CAROTID SYSTEM: Normal in course and caliber without flow-limiting   stenosis or occlusion.    VERTEBRAL SYSTEM:  Normal in course and caliber without flow-limiting   stenosis or occlusion. Origin of the vertebral arteries are unremarkable.    AORTIC ARCH: Origin of the great vessels are unremarkable.    MISCELLANEOUS: Multiple subcentimeter hypoattenuating thyroid nodules.    IMPRESSION:    CT PERFUSION: No perfusion abnormality.    CTA BRAIN: Patent intracranial circulation. No proximal large vessel   occlusion or significant stenosis.    CTA NECK: Patent cervical vasculature. Significant flow-limiting stenosis   or evidence of acute dissection within the cervical carotid or vertebral   arteries.    --- End of Report ---  ADELINE MOON MD; Resident Radiologist  This document has been electronically signed.  DEISY HILLIARD MD; Attending Radiologist  This document has been electronically signed. 2023  9:19PM    < end of copied text >    ** CTA was addended for typo for "no significant flow limiting stenosis"

## 2023-02-08 NOTE — ED ADULT NURSE NOTE - OBJECTIVE STATEMENT
Patient came to ED with c/o left arm throbbing and strange sensation to right side of face.  Pt feels weakness to right side and feels like she may pass out.  Pt dx with HTN last week and started on 5mg Amlodipine which she takes at 19:30.  Pt having moments of staring and inattention while being asked questions and needs to be "brought back" into focus. Alert and oriented x 4, Normally ambulates independently. Safety maintained. 20G IV line inserted in right AC. due labs sent. patient became very nervous prior to CT. 0.5mg of Ativan Inj given by MESHA Juan. CT performed. patient got transferred into room 11. Report given to Primary RN

## 2023-02-08 NOTE — ED ADULT TRIAGE NOTE - CHIEF COMPLAINT QUOTE
Pt arrives to ED c/o HTN with left arm throbbing and strange sensation to right side of face.  Pt feels weakness to right side and feels like she may pass out.  Pt dx with HTN last week and started on 5mg Amlodipine which she takes at 19:30. Pt arrives to ED c/o HTN with left arm throbbing and strange sensation to right side of face.  Pt feels weakness to right side and feels like she may pass out.  Pt dx with HTN last week and started on 5mg Amlodipine which she takes at 19:30.  Pt having moments of staring and inattention while being asked questions and needs to be "brought back" into focus by interviewer.  fs = 89.  Last known well 19:30 as per pt stating it is when her face started feeling "different."

## 2023-02-08 NOTE — CONSULT NOTE ADULT - ATTENDING COMMENTS
stroke code called in ED and neurology emergently asessed suzinet   Briefly   Patient GERDA is a 51yo F  HTN presents to ED for constellation of symptoms for which code stroke called. Patient presented ~1 week ago for chest pain and had cardiac w/u performed and concern for costochondritis. Today LKW 7PM 2/8/23 and stated she began to develop posterior (parieto-occipital) headache, mild R facial numbness, shoulder to mid L arm pain, lightheadedness, for which she came to ED as code stroke. Otherwise denies blurry/double vision, nausea, vomiting, dizziness, focal extremity weakness. Symptoms make it difficult for her to ambulate but not falling towards one side. ED called emergency contact Deuce Juárez who states patient lives alone, unable to provide additional history. In ED code stroke called, CTA, CTP, CTH obtained and without evidence of hemorrhage, territorial acute/subacute infarct, large vessel occlusion. No core/penumbra. Discussed patient case in detail and had patient on [employer] video (telestroke cart not loading) with telestroke attending Dr Bledsoe and re-examined patient and deemed patient with minimal non-disabling deficits that would be explained by a potential stroke if present. Therefore, no tenecteplase/ thrombectomy.      LKW: 7PM 2/8/23  NIHSS: 1    Baseline MRS: 0   Not a TNKase candidate due to nondisabling mild deficits  Not a thrombectomy candidate due to no large vessel occlusion    CT head w/o con: No acute intra-cranial hemorrhage, mass effect, or CT evidence of acute   vascular territorial infarction.   CTA head/neck w/ con: no flow limiting stenosis/ occlusion  CT perfusion: no core, penumbra  MRI R BG small vessel infarct   o/e 2/9 symptoms improved     Impression: HA and numbness likely related to BP changes and possible sinus diseaes. r/o stroke  old R BG small vessel infarct     Recommendations:  [ ] Aspirin 81mg daily    [ ] Atorvastatin 40-80 mg daily titrated per LDL < 70  [ ] HgbA1C, fasting lipid panel, CBC, CMP, coag panel, troponin  [ ] TTE w/ bubble study   [ ] telemetry to check for arrhythmia, EKG     - Tight glucose control (long-term goal HgbA1c < 6%)  - Stroke education and counseling  - Neuro-checks and VS q4h  - Permissive HTN up to 220/120 for 24-48h from symptom onset  - Dysphagia screen. If fails, speech/swallow eval  - aspiration, fall precautions  - STAT CT head non-contrast for change in neuro exam.   - PT/ OT / DVT ppx per primary team if admitted  - no objection to d/c planning   Michael Geiger MD  Vascular Neurology  Office: [phone]

## 2023-02-08 NOTE — STROKE CODE NOTE - READ BY:
Delay in obtaining CT: patient became anxious nervous requiring medication, hesitated for obtaining CT imaging

## 2023-02-08 NOTE — CONSULT NOTE ADULT - ASSESSMENT
Patient GERDA is a 51yo F PMHx HTN presents to ED for constellation of symptoms for which code stroke called. Patient presented ~1 week ago for chest pain and had cardiac w/u performed and concern for costochondritis. Today LKW 7PM 2/8/23 and stated she began to develop posterior (parieto-occipital) headache, mild R facial numbness, shoulder to mid L arm pain, lightheadedness, for which she came to ED as code stroke. Otherwise denies blurry/double vision, nausea, vomiting, dizziness, focal extremity weakness. Symptoms make it difficult for her to ambulate but not falling towards one side. ED called emergency contact Deuce Juárez who states patient lives alone, unable to provide additional history. In ED code stroke called, CTA, CTP, CTH obtained and without evidence of hemorrhage, territorial acute/subacute infarct, large vessel occlusion. No core/penumbra. Discussed patient case in detail and had patient on [employer] teams video (telestroke cart not loading) with telestroke attending Dr Bledsoe and re-examined patient and deemed patient with minimal non-disabling deficits that would be explained by a potential stroke if present. Therefore, no tenecteplase/ thrombectomy.      LKW: 7PM 2/8/23  NIHSS: 1    Baseline MRS: 0   Not a TNKase candidate due to nondisabling mild deficits  Not a thrombectomy candidate due to no large vessel occlusion    CT head w/o con: No acute intra-cranial hemorrhage, mass effect, or CT evidence of acute   vascular territorial infarction.   CTA head/neck w/ con: no flow limiting stenosis/ occlusion  CT perfusion: no core, penumbra    Impression: Posterior headaches, mild R facial numbness, L shoulder pain, lightheadedness, hypertensive to 180s SBP, suspicious for hypertensive urgency? Will evaluate for ischemic stroke pertaining to posterior circulation given R face and exam with very subtle LUE dysmetria.      Mechanism: unknown    Recommendations:  [ ] Aspirin 81mg daily + 3 weeks of Plavix 75 mg daily (can load with 300mg x1 plavix) for now until MRI is obtained and once cleared by primary team for contraindicating cardiac etiologies that may explain patient symptoms.   [ ] Atorvastatin 40-80 mg daily titrated per LDL < 70  [ ] HgbA1C, fasting lipid panel, CBC, CMP, coag panel, troponin  [ ] MRI brain w/o con  [ ] TTE w/ bubble study if found with stroke  [ ] telemetry to check for arrhythmia, EKG     - Tight glucose control (long-term goal HgbA1c < 6%)  - Stroke education and counseling  - Neuro-checks and VS q4h  - Permissive HTN up to 220/120 for 24-48h from symptom onset  - Dysphagia screen. If fails, speech/swallow eval  - aspiration, fall precautions  - STAT CT head non-contrast for change in neuro exam.   - PT/ OT / DVT ppx per primary team if admitted    Discussed with telestroke attending Dr Rea Bledsoe regarding decision against candidacy for TNKase/ thrombectomy. Will be formally staffed on morning rounds with attending. Recommendations will be complete once signed by attending. Patient GERDA is a 51yo F PMHx HTN presents to ED for constellation of symptoms for which code stroke called. Patient presented ~1 week ago for chest pain and had cardiac w/u performed and concern for costochondritis. Today LKW 7PM 2/8/23 and stated she began to develop posterior (parieto-occipital) headache, mild R facial numbness, shoulder to mid L arm pain, lightheadedness, for which she came to ED as code stroke. Otherwise denies blurry/double vision, nausea, vomiting, dizziness, focal extremity weakness. Symptoms make it difficult for her to ambulate but not falling towards one side. ED called emergency contact Deuce Juárez who states patient lives alone, unable to provide additional history. In ED code stroke called, CTA, CTP, CTH obtained and without evidence of hemorrhage, territorial acute/subacute infarct, large vessel occlusion. No core/penumbra. Discussed patient case in detail and had patient on [employer] teams video (telestroke cart not loading) with telestroke attending Dr Bledsoe and re-examined patient and deemed patient with minimal non-disabling deficits that would be explained by a potential stroke if present. Therefore, no tenecteplase/ thrombectomy.      LKW: 7PM 2/8/23  NIHSS: 1    Baseline MRS: 0   Not a TNKase candidate due to nondisabling mild deficits  Not a thrombectomy candidate due to no large vessel occlusion    CT head w/o con: No acute intra-cranial hemorrhage, mass effect, or CT evidence of acute   vascular territorial infarction.   CTA head/neck w/ con: no flow limiting stenosis/ occlusion  CT perfusion: no core, penumbra    Impression: Posterior headaches, mild R facial numbness, L shoulder pain, lightheadedness, hypertensive to 180s SBP, of unclear etiology. Will evaluate for ischemic stroke pertaining to posterior circulation given R face involvement and exam with very subtle LUE dysmetria. Mechanism: unknown    Recommendations:  [ ] Aspirin 81mg daily + 3 weeks of Plavix 75 mg daily (can load with 300mg x1 plavix) for now until MRI is obtained and once cleared by primary team for contraindicating cardiac etiologies that may explain patient symptoms.   [ ] Atorvastatin 40-80 mg daily titrated per LDL < 70  [ ] HgbA1C, fasting lipid panel, CBC, CMP, coag panel, troponin  [ ] MRI brain w/o con  [ ] TTE w/ bubble study if found with stroke  [ ] telemetry to check for arrhythmia, EKG     - Tight glucose control (long-term goal HgbA1c < 6%)  - Stroke education and counseling  - Neuro-checks and VS q4h  - Permissive HTN up to 220/120 for 24-48h from symptom onset  - Dysphagia screen. If fails, speech/swallow eval  - aspiration, fall precautions  - STAT CT head non-contrast for change in neuro exam.   - PT/ OT / DVT ppx per primary team if admitted    Discussed with telestroke attending Dr Rea Bledsoe regarding decision against candidacy for TNKase/ thrombectomy. Will be formally staffed on morning rounds with attending. Recommendations will be complete once signed by attending. Patient GERDA is a 51yo F PMHx HTN presents to ED for constellation of symptoms for which code stroke called. Patient presented ~1 week ago for chest pain and had cardiac w/u performed and concern for costochondritis. Today LKW 7PM 2/8/23 and stated she began to develop posterior (parieto-occipital) headache, mild R facial numbness, shoulder to mid L arm pain, lightheadedness, for which she came to ED as code stroke. Otherwise denies blurry/double vision, nausea, vomiting, dizziness, focal extremity weakness. Symptoms make it difficult for her to ambulate but not falling towards one side. ED called emergency contact Deuce Juárez who states patient lives alone, unable to provide additional history. In ED code stroke called, CTA, CTP, CTH obtained and without evidence of hemorrhage, territorial acute/subacute infarct, large vessel occlusion. No core/penumbra. Discussed patient case in detail and had patient on [employer] video (telestroke cart not loading) with telestroke attending Dr Bledsoe and re-examined patient and deemed patient with minimal non-disabling deficits that would be explained by a potential stroke if present. Therefore, no tenecteplase/ thrombectomy.      LKW: 7PM 2/8/23  NIHSS: 1    Baseline MRS: 0   Not a TNKase candidate due to nondisabling mild deficits  Not a thrombectomy candidate due to no large vessel occlusion    CT head w/o con: No acute intra-cranial hemorrhage, mass effect, or CT evidence of acute   vascular territorial infarction.   CTA head/neck w/ con: no flow limiting stenosis/ occlusion  CT perfusion: no core, penumbra    Impression: Posterior headaches, mild R facial numbness, L shoulder pain, lightheadedness, hypertensive to 180s SBP, of unclear etiology. Will evaluate for ischemic stroke pertaining to posterior circulation given R face involvement and exam with very subtle LUE dysmetria. Mechanism: unknown    Recommendations:  [ ] Aspirin 81mg daily + 3 weeks of Plavix 75 mg daily (can load with 300mg x1 plavix) for now until MRI is obtained and if primary team deems patient does not have contraindicating cardiac etiologies and clears for dual antiplatelet.  [ ] Atorvastatin 40-80 mg daily titrated per LDL < 70  [ ] HgbA1C, fasting lipid panel, CBC, CMP, coag panel, troponin  [ ] MRI brain w/o con  [ ] TTE w/ bubble study if found with stroke  [ ] telemetry to check for arrhythmia, EKG     - Tight glucose control (long-term goal HgbA1c < 6%)  - Stroke education and counseling  - Neuro-checks and VS q4h  - Permissive HTN up to 220/120 for 24-48h from symptom onset  - Dysphagia screen. If fails, speech/swallow eval  - aspiration, fall precautions  - STAT CT head non-contrast for change in neuro exam.   - PT/ OT / DVT ppx per primary team if admitted    Discussed with telestroke attending Dr Rea Bledsoe regarding decision against candidacy for TNKase/ thrombectomy. Will be formally staffed on morning rounds with attending. Recommendations will be complete once signed by attending.

## 2023-02-08 NOTE — ED ADULT NURSE NOTE - NSIMPLEMENTINTERV_GEN_ALL_ED
Implemented All Fall Risk Interventions:  [place] to call system. Call bell, personal items and telephone within reach. Instruct patient to call for assistance. Room bathroom lighting operational. Non-slip footwear when patient is off stretcher. Physically safe environment: no spills, clutter or unnecessary equipment. Stretcher in lowest position, wheels locked, appropriate side rails in place. Provide visual cue, wrist band, yellow gown, etc. Monitor gait and stability. Monitor for mental status changes and reorient to person, place, and time. Review medications for side effects contributing to fall risk. Reinforce activity limits and safety measures with patient and family.

## 2023-02-08 NOTE — ED ADULT NURSE NOTE - CHIEF COMPLAINT QUOTE
Pt arrives to ED c/o HTN with left arm throbbing and strange sensation to right side of face.  Pt feels weakness to right side and feels like she may pass out.  Pt dx with HTN last week and started on 5mg Amlodipine which she takes at 19:30.  Pt having moments of staring and inattention while being asked questions and needs to be "brought back" into focus by interviewer.  fs = 89.  Last known well 19:30 as per pt stating it is when her face started feeling "different."

## 2023-02-09 VITALS
DIASTOLIC BLOOD PRESSURE: 63 MMHG | TEMPERATURE: 99 F | HEART RATE: 80 BPM | OXYGEN SATURATION: 99 % | SYSTOLIC BLOOD PRESSURE: 103 MMHG | RESPIRATION RATE: 18 BRPM

## 2023-02-09 LAB — T PALLIDUM AB TITR SER: NEGATIVE — SIGNIFICANT CHANGE UP

## 2023-02-09 PROCEDURE — 99236 HOSP IP/OBS SAME DATE HI 85: CPT

## 2023-02-09 PROCEDURE — 93306 TTE W/DOPPLER COMPLETE: CPT | Mod: 26

## 2023-02-09 PROCEDURE — 70551 MRI BRAIN STEM W/O DYE: CPT | Mod: 26,MA

## 2023-02-09 RX ORDER — AMLODIPINE BESYLATE 2.5 MG/1
5 TABLET ORAL DAILY
Refills: 0 | Status: DISCONTINUED | OUTPATIENT
Start: 2023-02-09 | End: 2023-02-12

## 2023-02-09 RX ADMIN — Medication 975 MILLIGRAM(S): at 00:56

## 2023-02-09 RX ADMIN — Medication 1 MILLIGRAM(S): at 09:35

## 2023-02-09 NOTE — ED PROVIDER NOTE - PROGRESS NOTE DETAILS
Jhoan Strauss PGY-3 patient sxs improved, states she is now having R sided headache. will dose with meds. as per neuro note will place in obs for MRI.  mental status improved

## 2023-02-09 NOTE — ED PROVIDER NOTE - OBJECTIVE STATEMENT
51yo F PMHx HTN presents to ED for headache, and weakness. pt earlier began feeling sxs of mild R facial numbness, shoulder to mid L arm pain, lightheadedness. Otherwise denies blurry/double vision, nausea, vomiting, dizziness, focal extremity weakness. Symptoms make it difficult for her to ambulate but not falling towards one side. pt unable to explain when sxs started  code stroke activated  pt denies any fever, cp, sob, abdominal pain, v/d, dysuria, numbness

## 2023-02-09 NOTE — ED CDU PROVIDER INITIAL DAY NOTE - PROGRESS NOTE DETAILS
in the morning, pt had low bp. repeat bp normotensive. pt has no complaints at this time. MRI showed "Abnormal T2 prolongation in the periventricular white matter region as described above." discussed with stroke team who interpreted the result as chronic finding and unspecific for her symptoms; recommended outpatient f/u. pt's bp is well controlled here, advised to f/u with pcp.

## 2023-02-09 NOTE — ED CDU PROVIDER DISPOSITION NOTE - ATTENDING CONTRIBUTION TO CARE
I have personally performed a face to face medical and diagnostic evaluation of the patient. I have discussed with and reviewed the DESTIN's note and agree with the History, ROS, Physical Exam and MDM unless otherwise indicated. A brief summary of my personal evaluation and impression can be found below.    Gloria YEAGER:53yo F PMHx HTN presents to ED for headache, and weakness. pt earlier began feeling sxs of mild R facial numbness, shoulder to mid L arm pain, lightheadedness. Otherwise denies blurry/double vision, nausea, vomiting, dizziness, focal extremity weakness. Symptoms make it difficult for her to ambulate but not falling towards one side. pt unable to explain when sxs started. code stroke activated. while in ED labs unremarkable, CT imaging negative. patient sxs improved, states now having R sided headache. Meds given in ED. Neuro following, recommending CDU for neuro checks and MRI.  in the morning, pt had low bp. repeat bp normotensive. pt has no complaints at this time. MRI showed "Abnormal T2 prolongation in the periventricular white matter region as described above." discussed with   stroke team who interpreted the result as chronic finding and unspecific for her symptoms; recommended outpatient f/u. pt's bp is well controlled here, advised to f/u with pcp.      No acute events overnight, feels better this morning, tolerated PO w/o issue, can move everything and feel everything.     VITALS: Initial triage and subsequent vitals have been reviewed by me.  GEN APPEARANCE: WDWN, alert, non-toxic, NAD  HEAD: Atraumatic.  EYES: PERRLa, EOMI, vision grossly intact.   NECK: Supple  CV: RRR, S1S2, no c/r/m/g. Cap refill <2 seconds. No bruits.   LUNGS: CTAB. No abnormal breath sounds.  ABDOMEN: Soft, NTND. No guarding or rebound.   MSK/EXT: No spinal or extremity point tenderness. No CVA ttp. Pelvis stable. No peripheral edema.  NEURO: Alert, follows commands. Weight bearing normal. Speech normal. Sensation and motor normal x4 extremities. CN2-12 normal, coordination normal, ambulating normally.  UE & LE 5/5 b/l.  SKIN: Warm, dry and intact. No rash.  PSYCH: Appropriate    Plan/MDM: Gloria YEAGER:53yo F PMHx HTN presents to ED for headache, and weakness. pt earlier began feeling sxs of mild R facial numbness, shoulder to mid L arm pain, lightheadedness. Otherwise denies blurry/double vision, nausea, vomiting, dizziness, focal extremity weakness. Symptoms make it difficult for her to ambulate but not falling towards one side. pt unable to explain when sxs started. code stroke activated. while in ED labs unremarkable, CT imaging negative. patient sxs improved, states now having R sided headache. Meds given in ED. Neuro following, recommending CDU for neuro checks and MRI.  in the morning, pt had low bp. repeat bp normotensive. pt has no complaints at this time. MRI showed "Abnormal T2 prolongation in the periventricular white matter region as described above." discussed with   stroke team who interpreted the result as chronic finding and unspecific for her symptoms; recommended outpatient f/u. pt's bp is well controlled here, advised to f/u with pcp.  exam vss non toxic PE as above ddx c/f likely headache vs migraine, noted MR results discussed w neuro as above, they have cleared for dc, Strict return precautions were discussed. Pt expressed understanding.  pt agreeable and comfortable w plan for dc.

## 2023-02-09 NOTE — ED CDU PROVIDER INITIAL DAY NOTE - MDM ORDERS SUBMITTED SELECTION
Quality 431: Preventive Care And Screening: Unhealthy Alcohol Use - Screening: Unhealthy alcohol use screening not performed, reason not otherwise specified
Quality 110: Preventive Care And Screening: Influenza Immunization: Influenza Immunization Administered during Influenza season
Quality 226: Preventive Care And Screening: Tobacco Use: Screening And Cessation Intervention: Patient screened for tobacco use and is an ex/non-smoker
Detail Level: Detailed
Quality 130: Documentation Of Current Medications In The Medical Record: Current Medications Documented
Labs/Imaging Studies/Medications

## 2023-02-09 NOTE — ED CDU PROVIDER DISPOSITION NOTE - CLINICAL COURSE
51yo F PMHx HTN presents to ED for headache, and weakness. pt earlier began feeling sxs of mild R facial numbness, shoulder to mid L arm pain, lightheadedness. Otherwise denies blurry/double vision, nausea, vomiting, dizziness, focal extremity weakness. Symptoms make it difficult for her to ambulate but not falling towards one side. pt unable to explain when sxs started. code stroke activated. while in ED labs unremarkable, CT imaging negative. patient sxs improved, states now having R sided headache. Meds given in ED. Neuro following, recommending CDU for neuro checks and MRI.  in the morning, pt had low bp. repeat bp normotensive. pt has no complaints at this time. MRI showed "Abnormal T2 prolongation in the periventricular white matter region as described above." discussed with stroke team who interpreted the result as chronic finding and unspecific for her symptoms; recommended outpatient f/u. pt's bp is well controlled here, advised to f/u with pcp.

## 2023-02-09 NOTE — ED PROVIDER NOTE - ATTENDING CONTRIBUTION TO CARE
52-year-old female past medical history hypertension presented to ED for headache weakness, right facial numbness, left shoulder and arm pain.  Code stroke activated from triage the symptoms to have started approximately 7:30 PM.  She denies any changes in vision or hearing, extremity weakness, chest pain, abdominal pain, nausea or vomiting.  She denies any trauma.  Patient reports difficulty ambulating but observed being able to walk.  Denies drugs, alcohol, tobacco.  Patient denies taking anti-platelet or anti-coagulation agents..  Exam as above.  Constellation of symptoms concerning for complex migraine versus electrolyte abnormality versus metabolic encephalopathy, r/o ischemic stroke.  Patient with very low NIH stroke scale.  Plan CT/CTA/CT perfusion, labs, symptom relief as needed.  Patient very anxious and hesitant about having CT performed.  Given Ativan for anxiolysis to facilitate CT.

## 2023-02-09 NOTE — ED PROVIDER NOTE - CLINICAL SUMMARY MEDICAL DECISION MAKING FREE TEXT BOX
53yo F PMHx HTN presents to ED for headache, and weakness. pt earlier began feeling sxs of mild R facial numbness, shoulder to mid L arm pain  vss, on exam mild ataxia. pending labs and imaging. code stroke activated.   pending neuro recs  ddx complex migraine vs metabolic encephalopathy vs ischemic stroke

## 2023-02-09 NOTE — ED CDU PROVIDER INITIAL DAY NOTE - CLINICAL SUMMARY MEDICAL DECISION MAKING FREE TEXT BOX
51yo F PMHx HTN presents to ED for headache, and weakness. pt earlier began feeling sxs of mild R facial numbness, shoulder to mid L arm pain, lightheadedness. Otherwise denies blurry/double vision, nausea, vomiting, dizziness, focal extremity weakness. Symptoms make it difficult for her to ambulate but not falling towards one side. pt unable to explain when sxs started  code stroke activated  while in ED labs unremarkable, CT imaging negative. patient sxs improved, states now having R sided headache. Meds given in ED. Neuro following, recommending CDU for neuro checks and MRI.

## 2023-02-09 NOTE — ED CDU PROVIDER INITIAL DAY NOTE - ATTENDING APP SHARED VISIT CONTRIBUTION OF CARE
52-year-old female past medical history hypertension presented to ED for headache weakness, right facial numbness, left shoulder and arm pain.  Code stroke activated from triage the symptoms to have started approximately 7:30 PM.  She denies any changes in vision or hearing, extremity weakness, chest pain, abdominal pain, nausea or vomiting.  She denies any trauma.  Patient reports difficulty ambulating but observed being able to walk.  Denies drugs, alcohol, tobacco.  Patient denies taking anti-platelet or anti-coagulation agents..  Exam as above.  Constellation of symptoms concerning for complex migraine versus electrolyte abnormality versus metabolic encephalopathy, r/o ischemic stroke.  Patient with very low NIH stroke scale.  Plan CT/CTA/CT perfusion, labs, symptom relief as needed.  Patient very anxious and hesitant about having CT performed.  Given Ativan for anxiolysis to facilitate CT.  CT without significant abnormality and Neuro rec CDU for MRI and neuro-checks.

## 2023-02-09 NOTE — ED PROVIDER NOTE - PHYSICAL EXAMINATION
Vital signs reviewed  GENERAL: Patient nontoxic appearing, NAD  HEAD: NCAT  EYES: Anicteric  ENT: MMM  NECK: Supple, non tender  RESPIRATORY: Normal respiratory effort. CTA B/L. No wheezing, rales, rhonchi  CARDIOVASCULAR: Regular rate and rhythm  ABDOMEN: Soft. Nondistended. Nontender. No guarding or rebound. No CVA tenderness.  MUSCULOSKELETAL/EXTREMITIES: Brisk cap refill. 2+ radial pulses. No leg edema.  SKIN:  Warm and dry  MENTAL STATUS: AAOx3  LANG/SPEECH: Fluent, slow speech, confusion   CRANIAL NERVES:  II: Pupils equal and reactive,   III, IV, VI: EOM intact, no gaze preference or deviation  V: normal  VII: no facial asymmetry  VIII: normal hearing to speech  MOTOR: 5/5 in both upper and lower extremities  SENSORY: Normal to touch  COORD: Normal finger to nose. +ataxia   PSYCHIATRIC: Cooperative. Affect appropriate.

## 2023-02-09 NOTE — ED CDU PROVIDER INITIAL DAY NOTE - OBJECTIVE STATEMENT
53yo F PMHx HTN presents to ED for headache, and weakness. pt earlier began feeling sxs of mild R facial numbness, shoulder to mid L arm pain, lightheadedness. Otherwise denies blurry/double vision, nausea, vomiting, dizziness, focal extremity weakness. Symptoms make it difficult for her to ambulate but not falling towards one side. pt unable to explain when sxs started  code stroke activated  while in ED labs unremarkable, CT imaging negative. patient sxs improved, states now having R sided headache. Meds given in ED. Neuro following, recommending CDU for neuro checks and MRI.

## 2023-02-09 NOTE — ED ADULT NURSE REASSESSMENT NOTE - NS ED NURSE REASSESS COMMENT FT1
Pt AOX4. Rec'd report from float RN. Pt resting in stretcher with family at bedside. Pt is normal sinus on monitor. Safety and comfort measures maintained.
Pt received from main ED, was oriented to room and unit, Pt denied any pain,  dizziness, SOB, blurry vision, headache, facial numbness.  Pt reported that weakness is improving. Pt is sinus rhythm on tele. call bell with in reach. safety maintained. will continue to monitor
pt ambulatory to restroom with slow but steady gait, significant other ambulating with patient for assistance. pt offered wheelchair and declined.
stroke team rounds led by md Geiger. pt off unit at time of rounds. pt to be seen by MD upon pt return
Pt AOX4. Pt reassessed for pain. Pt will be brought over to CDU. Comfort and safety measures maintained.

## 2023-02-09 NOTE — ED CDU PROVIDER DISPOSITION NOTE - NSFOLLOWUPINSTRUCTIONS_ED_ALL_ED_FT
Continue aspirin and statin you are currently taking.     Follow up with your cardiologist or other cardiology (referral list provided or you may call [phone] to schedule an appointment ASAP. Most patients can be seen within 48 hours. Mention that you were just discharged from the emergency room and need to be seen immediately.; or you may call [phone] to make an appointment with the cardiology clinic)    Your blood pressure here is normal without medications, and low in the morning.   Follow up with your primary care physician in 48-72 hours for blood pressure control - bring copies of your results.      Return to the ER for worsening or persistent symptoms, and/or ANY NEW OR CONCERNING SYMPTOMS: dizziness, headache, nausea, vomiting, weakness. If you have issues obtaining follow up, please call: [phone] to obtain a doctor or specialist who takes your insurance in your area.  You may call [phone] to make an appointment with the internal medicine clinic.

## 2023-02-09 NOTE — ED CDU PROVIDER DISPOSITION NOTE - PATIENT PORTAL LINK FT
You can access the FollowMyHealth Patient Portal offered by [facility] by registering at the following website: [email or web address]. By joining [facility]’s FollowMyHealth portal, you will also be able to view your health information using other applications (apps) compatible with our system.

## 2023-02-17 ENCOUNTER — EMERGENCY (EMERGENCY)
Facility: HOSPITAL | Age: 53
LOS: 1 days | Discharge: HOME | End: 2023-02-17
Attending: EMERGENCY MEDICINE | Admitting: EMERGENCY MEDICINE
Payer: COMMERCIAL

## 2023-02-17 VITALS — HEART RATE: 99 BPM | OXYGEN SATURATION: 100 % | HEIGHT: 64 IN | TEMPERATURE: 98 F | RESPIRATION RATE: 18 BRPM

## 2023-02-17 VITALS
RESPIRATION RATE: 18 BRPM | OXYGEN SATURATION: 98 % | TEMPERATURE: 99 F | DIASTOLIC BLOOD PRESSURE: 99 MMHG | SYSTOLIC BLOOD PRESSURE: 142 MMHG | HEART RATE: 73 BPM

## 2023-02-17 DIAGNOSIS — Z98.89 OTHER SPECIFIED POSTPROCEDURAL STATES: Chronic | ICD-10-CM

## 2023-02-17 DIAGNOSIS — N97.9 FEMALE INFERTILITY, UNSPECIFIED: Chronic | ICD-10-CM

## 2023-02-17 LAB
CRP SERPL-MCNC: <3 MG/L — SIGNIFICANT CHANGE UP
ERYTHROCYTE [SEDIMENTATION RATE] IN BLOOD: 12 MM/HR — SIGNIFICANT CHANGE UP (ref 4–25)
FOLATE SERPL-MCNC: >20 NG/ML — HIGH (ref 3.1–17.5)
VIT B12 SERPL-MCNC: 1075 PG/ML — HIGH (ref 200–900)

## 2023-02-17 PROCEDURE — 99285 EMERGENCY DEPT VISIT HI MDM: CPT

## 2023-02-17 PROCEDURE — 99284 EMERGENCY DEPT VISIT MOD MDM: CPT

## 2023-02-17 NOTE — ED PROVIDER NOTE - CLINICAL SUMMARY MEDICAL DECISION MAKING FREE TEXT BOX
Luz Elena: For the last month, pt has had moving neuro complaints (L arm discomfort, R and L face discomfort (still has R face discomfort)) and occasional HTN (SBP ~150-160/; no h/o same). MR brain last wk. w/ suspicion of demyelinating disease. Cardiac w/u unremarkable. Will consult Neuro w/ question: benefit of MR brain w/ and w/o contrast and full spine MRI, and benefit of LP for oligoclonal bands.

## 2023-02-17 NOTE — ED PROVIDER NOTE - PROGRESS NOTE DETAILS
Rosa Vera PGY2: I received sign out on this patient. I have reassessed patient and agree with the current plan. Will follow-up labs/imaging. Neuro saw patient at bedside. Labs pending. Neuro recs pending.

## 2023-02-17 NOTE — CONSULT NOTE ADULT - SUBJECTIVE AND OBJECTIVE BOX
Neurology - Consult Note    -  Spectra: [phone] ([facility]), [phone] ([facility])  -    HPI: Patient VIVEK ADLER is a 52y (1970) wo/man with a PMHx significant for ***      Review of Systems:  INCOMPLETE   CONSTITUTIONAL: No fevers or chills  EYES AND ENT: No visual changes or no throat pain   NECK: No pain or stiffness  RESPIRATORY: No hemoptysis or shortness of breath  CARDIOVASCULAR: No chest pain or palpitations  GASTROINTESTINAL: No melena or hematochezia  GENITOURINARY: No dysuria or hematuria  NEUROLOGICAL: +As stated in HPI above  SKIN: No itching, burning, rashes, or lesions   All other review of systems is negative unless indicated above.    Allergies:  penicillin (Other)      PMHx/PSHx/Family Hx: As above, otherwise see below   Asthma    Anemia    Knee injury    Female infertility    Tubal occlusion    Infertility, female        Social Hx:  No current use of tobacco, alcohol, or illicit drugs  Lives with ***    Medications:  MEDICATIONS  (STANDING):    MEDICATIONS  (PRN):      Vitals:  T(C): 37.1 (02-17-23 @ 07:24), Max: 37.1 (02-17-23 @ 07:24)  HR: 73 (02-17-23 @ 07:24) (73 - 99)  BP: 142/99 (02-17-23 @ 07:24) (142/99 - 142/99)  RR: 18 (02-17-23 @ 07:24) (18 - 18)  SpO2: 98% (02-17-23 @ 07:24) (98% - 100%)    Physical Examination: INCOMPLETE  General - NAD, pleasant, cooperative   Cardiovascular - Peripheral pulses palpable, no edema  Neurologic Exam:  Mental status - Awake, Alert, Oriented to person, place, and time. Speech fluent, repetition and naming intact. Follows simple and complex commands. Attention/concentration, recent and remote memory (including registration and recall), and fund of knowledge intact    Cranial nerves:  CN II: Visual fields are full to confrontation. Fundoscopic exam is normal with sharp discs. Pupils are 4 mm and briskly reactive to light. Visual acuity is 20/20 bilaterally.  CN III, IV, VI: EOMI, no nystagmus, no ptosis  CN V: Facial sensation is intact to pinprick in all 3 divisions bilaterally.  CN VII: Face is symmetric with normal eye closure and smile.  CN VII: Hearing is normal to rubbing fingers  CN IX, X: Palate elevates symmetrically. Phonation is normal.  CN XI: Head turning and shoulder shrug are intact  CN XII: Tongue is midline with normal movements and no atrophy.    Motor - Normal bulk and tone throughout. No pronator drift of out-stretched arms.  Strength testing            Deltoid      Biceps      Triceps     Wrist Extension    Wrist Flexion     Interossei         R            5                 5               5                     5                              5                        5                 5  L             5                 5               5                     5                              5                        5                 5              Hip Flexion    Hip Extension    Knee Flexion    Knee Extension    Dorsiflexion    Plantar Flexion  R              5                           5                       5                           5                            5                          5  L              5                           5                        5                           5                            5                          5    Sensation - Light touch/temperature OR pain/vibration intact in fingers and toes     DTR's -             Biceps      Triceps     Brachioradialis      Patellar    Ankle    Toes/plantar response  R             2+             2+                  2+                       2+            2+                 Down  L              2+             2+                 2+                        2+           2+                 Down    Coordination - Rapid alternating movements and fine finger movements are intact. There is no dysmetria on finger-to-nose and heel-knee-shin. There are no abnormal or extraneous movements. Romberg?    Gait and station - Posture is normal. Gait is steady with normal steps, base, arm swing, and turning. Heel and toe walking are normal. Tandem gait is normal     Labs:          CAPILLARY BLOOD GLUCOSE              CSF:                  Radiology:     Neurology - Consult Note    -  Spectra: [phone] ([facility]), [phone] ([facility])  -    HPI: Patient VIVEK BURCIAGA is a 52y (1970) woman with no past medical history presenting with right sided headache, right sided facial pain, and bilateral upper extremity joint pain. Ms. Burciaga was in good health until early December when she was diagnosed with the flu and several other viruses. Since her diagnosis with the flu, she reports intermittent hypertension, bilateral elbow joint pain, right sided headaches, right sided facial pain and fatigue. She denies any history of hypertension prior to December and reports no family history of hypertension. Her blood pressures have been in the 140s-150s since December which is high for her. She began taking medication for hypertension on 2/3. Since having the flu, she also reports intermittent right sided facial pain that begins in the ears and extends throughout the right side of her face. She compares the pain to sinus pain that she has felt with prior respiratory illnesses. She denies eye pain or changes in vision with these episodes. She has also experienced headaches on her right side that are worsened with light exposure. She denies tearing or changes in vision with these headaches. Throughout this time she also reports intermittent bilateral arm pain that is most pronounced in her elbow joints but is also present in her hands. She states that the pain is sometimes accompanied by numbness. She also endorses changes in sensation of her feet bilaterally. Specifically she feels as if her feet are swollen when there aren't any signs of edema. Due to this sensation, she feels nervous when taking steps and walking. She has been seen in the ED and urgent care multiple times for these concerns. She denies chest pain, constipation, or diarrhea.  Of note, the patient has not had a period since November of 2022.     Yesterday afternoon, the patient was driving to the hair salon when she began experiencing a right sided headache. Since she was concerned that the headache would progress to facial pain and arm pain, she pulled over and called her aunt who is a nurse for assistance. Although she did not measure her BP, she believed her symptoms may have been due to elevated blood pressures and took an extra blood pressure pill at that time. At 3am this morning, she felt the right sided facial pain and headache coming on again and came into the hospital.       Review of Systems:    CONSTITUTIONAL: No fevers or chills  EYES AND ENT: No visual changes or no throat pain   NECK: No pain or stiffness  RESPIRATORY: No hemoptysis, shortness of breath  CARDIOVASCULAR: No chest pain or palpitations  GASTROINTESTINAL: No melena or hematochezia  GENITOURINARY: No dysuria or hematuria  NEUROLOGICAL: +As stated in HPI above  SKIN: No itching, burning, rashes, or lesions   All other review of systems is negative unless indicated above.    Allergies:  penicillin (Other)      PMHx/PSHx/Family Hx: As above, otherwise see below   Asthma    Anemia    Knee injury    Female infertility    Tubal occlusion    Infertility, female        Social Hx:  No current use of tobacco, alcohol, or illicit drugs  Lives with ***    Medications:  MEDICATIONS  (STANDING):    MEDICATIONS  (PRN):      Vitals:  T(C): 37.1 (02-17-23 @ 07:24), Max: 37.1 (02-17-23 @ 07:24)  HR: 73 (02-17-23 @ 07:24) (73 - 99)  BP: 142/99 (02-17-23 @ 07:24) (142/99 - 142/99)  RR: 18 (02-17-23 @ 07:24) (18 - 18)  SpO2: 98% (02-17-23 @ 07:24) (98% - 100%)    Physical Examination: INCOMPLETE  General - NAD, pleasant, cooperative   Cardiovascular - Peripheral pulses palpable, no edema  Neurologic Exam:  Mental status - Awake, Alert, Oriented to person, place, and time. Speech fluent, repetition and naming intact. Follows simple and complex commands. Attention/concentration, recent and remote memory (including registration and recall), and fund of knowledge intact    Cranial nerves:  CN II: Visual fields are full to confrontation. Fundoscopic exam is normal with sharp discs. Pupils are 4 mm and briskly reactive to light. Visual acuity is 20/20 bilaterally.  CN III, IV, VI: EOMI, no nystagmus, no ptosis  CN V: Facial sensation is intact to pinprick in all 3 divisions bilaterally.  CN VII: Face is symmetric with normal eye closure and smile.  CN VII: Hearing is normal to rubbing fingers  CN IX, X: Palate elevates symmetrically. Phonation is normal.  CN XI: Head turning and shoulder shrug are intact  CN XII: Tongue is midline with normal movements and no atrophy.    Motor - Normal bulk and tone throughout. No pronator drift of out-stretched arms.  Strength testing            Deltoid      Biceps      Triceps     Wrist Extension    Wrist Flexion     Interossei         R            5                 5               5                     5                              5                        5                 5  L             5                 5               5                     5                              5                        5                 5              Hip Flexion    Hip Extension    Knee Flexion    Knee Extension    Dorsiflexion    Plantar Flexion  R              5                           5                       5                           5                            5                          5  L              5                           5                        5                           5                            5                          5    Sensation - Light touch/temperature OR pain/vibration intact in fingers and toes     DTR's -             Biceps      Triceps     Brachioradialis      Patellar    Ankle    Toes/plantar response  R             2+             2+                  2+                       2+            2+                 Down  L              2+             2+                 2+                        2+           2+                 Down    Coordination - Rapid alternating movements and fine finger movements are intact. There is no dysmetria on finger-to-nose and heel-knee-shin. There are no abnormal or extraneous movements. Romberg?    Gait and station - Posture is normal. Gait is steady with normal steps, base, arm swing, and turning. Heel and toe walking are normal. Tandem gait is normal     Labs:          CAPILLARY BLOOD GLUCOSE              CSF:                  Radiology:     Neurology - Consult Note    -  Spectra: [phone] ([facility]), [phone] ([facility])  -    HPI: Patient VIVEK BURCIAGA is a 52y (1970) woman with no past medical history presenting with right sided headache, right sided facial pain, and bilateral upper extremity joint pain. Ms. Burciaga was in good health until early December when she was diagnosed with the flu and several other viruses. Since her diagnosis with the flu, she reports intermittent hypertension, bilateral elbow joint pain, right sided headaches, right sided facial pain and fatigue. She denies any history of hypertension prior to December and reports no family history of hypertension. Her blood pressures have been in the 140s-150s since December which is high for her. She began taking medication for hypertension on 2/3. Since having the flu, she also reports intermittent right sided facial pain that begins in the ears and extends throughout the right side of her face. She compares the pain to sinus pain that she has felt with prior respiratory illnesses. She denies eye pain or changes in vision with these episodes. She has also experienced headaches on her right side that are worsened with light exposure. She denies tearing or changes in vision with these headaches. Throughout this time she also reports intermittent bilateral arm pain that is most pronounced in her elbow joints but is also present in her hands. She states that the pain is sometimes accompanied by numbness. She also endorses changes in sensation of her feet bilaterally. Specifically she feels as if her feet are swollen when there aren't any signs of edema. Due to this sensation, she feels nervous when taking steps and walking. She has been seen in the ED and urgent care multiple times for these concerns. She denies chest pain, constipation, or diarrhea.  Of note, the patient has not had a period since November of 2022. Her periods were previously regular until March of 2022 when they became irregular. She states that her gynecologist believes she was perimenopausal since March and has now likely reached menopause. Prior to these new symptoms, Ms. Burciaga was very active reporting daily workouts with no fatigue, pain or hypertension. These symptoms have caused her a lot of anxiety and as a result, her PCP prescribed Lexapro for management on 2/9. The patient took one dose but did not continue taking it because it did not make her feel good.     Yesterday afternoon, the patient was driving to the hair salon when she began experiencing a right sided headache. Since she was concerned that the headache would progress to facial pain and arm pain, she pulled over and called her aunt who is a nurse for assistance. Although she did not measure her BP, she believed her symptoms may have been due to elevated blood pressures and took an extra blood pressure pill at that time. At 3am this morning, she felt the right sided facial pain and headache coming on again and came into the hospital.       Review of Systems:    CONSTITUTIONAL: No fevers or chills  EYES AND ENT: No visual changes or no throat pain   NECK: No pain or stiffness  RESPIRATORY: No hemoptysis, shortness of breath  CARDIOVASCULAR: No chest pain or palpitations  GASTROINTESTINAL: No melena or hematochezia  GENITOURINARY: No dysuria or hematuria  NEUROLOGICAL: +As stated in HPI above  SKIN: No itching, burning, rashes, or lesions   All other review of systems is negative unless indicated above.    Allergies:  penicillin (Other)      PMHx/PSHx/Family Hx: As above, otherwise see below   Asthma    Anemia    Knee injury    Female infertility    Tubal occlusion    Infertility, female        Social Hx:  No current use of tobacco, alcohol, or illicit drugs  The patient is a  and has felt that her recent symptoms are interfering with her work. She is very passionate about fitness and living a healthy lifestyle.     Medications:  MEDICATIONS  (STANDING):    MEDICATIONS  (PRN):      Vitals:  T(C): 37.1 (02-17-23 @ 07:24), Max: 37.1 (02-17-23 @ 07:24)  HR: 73 (02-17-23 @ 07:24) (73 - 99)  BP: 142/99 (02-17-23 @ 07:24) (142/99 - 142/99)  RR: 18 (02-17-23 @ 07:24) (18 - 18)  SpO2: 98% (02-17-23 @ 07:24) (98% - 100%)    Physical Examination:  General - NAD, pleasant, cooperative   Cardiovascular - Peripheral pulses palpable, no edema  Neurologic Exam:  Mental status - Awake, Alert, Oriented to person, place, and time. Speech fluent. Follows simple and complex commands. Attention/concentration, recent and remote memory (including registration and recall), and fund of knowledge intact.    Cranial nerves:  CN II: Visual fields are full to confrontation. Fundoscopic exam is normal with sharp discs. Pupils are 4 mm and briskly reactive to light. Visual acuity is 20/20 bilaterally.  CN III, IV, VI: EOMI, no nystagmus, no ptosis  CN V: Facial sensation is intact to light touch in all 3 divisions bilaterally.  CN VII: Face is symmetric with normal eye closure and smile.  CN VII: Hearing is normal to rubbing fingers  CN IX, X: Palate elevates symmetrically. Phonation is normal.  CN XI: Head turning and shoulder shrug are intact  CN XII: Tongue is midline with normal movements and no atrophy.    Motor - Normal bulk and tone throughout. No pronator drift of out-stretched arms.  Strength testing            Deltoid      Biceps      Triceps     Wrist Extension    Wrist Flexion     Interossei         R            5                 5               5                     5                              5                        5                 5  L             5                 5               5                     5                              5                        5                 5              Hip Flexion    Hip Extension    Knee Flexion    Knee Extension    Dorsiflexion    Plantar Flexion  R              5                           5                       5                           5                            5                          5  L              5                           5                        5                           5                            5                          5    Sensation - Light touch/temperature OR pain/vibration intact in fingers and toes     DTR's -             Biceps      Triceps     Brachioradialis      Patellar    Ankle    Toes/plantar response  R             2+             2+                  2+                       2+            2+                 Down  L              2+             2+                 2+                        2+           2+                 Down    Coordination - Rapid alternating movements and fine finger movements are intact. There is no dysmetria on finger-to-nose and heel-knee-shin. There are no abnormal or extraneous movements. Romberg?    Gait and station - Posture is normal. Gait is steady with slow steps. Heel and toe walking not performed. Tandem gait is normal.               CAPILLARY BLOOD GLUCOSE              CSF:                  Radiology:     Neurology - Consult Note    -  Spectra: [phone] ([facility]), [phone] ([facility])  -    HPI: Patient VIVEK BURCIAGA is a 52y (1970) woman with no past medical history presenting with right sided headache, right sided facial pain, and bilateral upper extremity joint pain. Ms. Burciaga was in good health until early December when she was diagnosed with the flu and several other viruses. Since her diagnosis with the flu, she reports intermittent hypertension, bilateral elbow joint pain, right sided headaches, right sided facial pain and fatigue. She denies any history of hypertension prior to December and reports no family history of hypertension. Her blood pressures have been in the 140s-150s since December which is high for her. She began taking medication for hypertension on 2/3. Since having the flu, she also reports intermittent right sided facial pain that begins in the ears and extends throughout the right side of her face. She compares the pain to sinus pain that she has felt with prior respiratory illnesses. She denies eye pain or changes in vision with these episodes. She has also experienced headaches on her right side that are worsened with light exposure. She denies tearing or changes in vision with these headaches. Throughout this time she also reports intermittent bilateral arm pain that is most pronounced in her elbow joints but is also present in her hands. She states that the pain is sometimes accompanied by numbness. She also endorses changes in sensation of her feet bilaterally. Specifically she feels as if her feet are swollen when there aren't any signs of edema. Due to this sensation, she feels nervous when taking steps and walking. She has been seen in the ED and urgent care multiple times for these concerns. She denies chest pain, constipation, or diarrhea.  Of note, the patient has not had a period since November of 2022. Her periods were previously regular until March of 2022 when they became irregular. She states that her gynecologist believes she was perimenopausal since March and has now likely reached menopause. Prior to these new symptoms, Ms. Burciaga was very active reporting daily workouts with no fatigue, pain or hypertension. These symptoms have caused her a lot of anxiety and as a result, her PCP prescribed Lexapro for management on 2/9. The patient took one dose but did not continue taking it because it did not make her feel good.     Yesterday afternoon, the patient was driving to the hair salon when she began experiencing a right sided headache. Since she was concerned that the headache would progress to facial pain and arm pain, she pulled over and called her aunt who is a nurse for assistance. Although she did not measure her BP, she believed her symptoms may have been due to elevated blood pressures and took an extra blood pressure pill at that time. At 3am this morning, she felt the right sided facial pain and headache coming on again and came into the hospital.     Of note, the patient presented on 2/9/23 for posterior-occipital headache, mild R facial numbness, shoulder and mid L arm pain and lightheadedness. SBP to 180s. NIHSS 1. Impression was HA and numbness likely related to BP changes and possible sinus disease MR scan demonstrated Abnormal T2 prolongation in the periventricular white matter region but no infarcts. Departed on aspirin and statin. However, was only taking blood pressure medication by the time of the current admission.       Review of Systems:    CONSTITUTIONAL: No fevers or chills  EYES AND ENT: No visual changes   RESPIRATORY: No shortness of breath  CARDIOVASCULAR: No chest pain   GASTROINTESTINAL: No changes in bowel movement   NEUROLOGICAL: +As stated in HPI above  All other review of systems is negative unless indicated above.    Allergies:  penicillin (Other)      PMHx/PSHx/Family Hx: As above, otherwise see below   Asthma    Anemia    Knee injury    Female infertility    Tubal occlusion    Infertility, female        Social Hx:  No current use of tobacco, alcohol, or illicit drugs  The patient is a  and has felt that her recent symptoms are interfering with her work. She is very passionate about fitness and living a healthy lifestyle.     Medications:  MEDICATIONS  (STANDING):    MEDICATIONS  (PRN):      Vitals:  T(C): 37.1 (02-17-23 @ 07:24), Max: 37.1 (02-17-23 @ 07:24)  HR: 73 (02-17-23 @ 07:24) (73 - 99)  BP: 142/99 (02-17-23 @ 07:24) (142/99 - 142/99)  RR: 18 (02-17-23 @ 07:24) (18 - 18)  SpO2: 98% (02-17-23 @ 07:24) (98% - 100%)    Physical Examination:  General - NAD, pleasant, cooperative   Cardiovascular - Peripheral pulses palpable, no edema  Neurologic Exam:  Mental status - Awake, Alert, Oriented to person, place, and time. Speech fluent. Follows simple and complex commands. Attention/concentration, recent and remote memory (including registration and recall), and fund of knowledge intact.    Cranial nerves:  CN II: Visual fields are full to confrontation. Fundoscopic exam is normal with sharp discs. Pupils are 4 mm and briskly reactive to light.   CN III, IV, VI: EOMI, no nystagmus, no ptosis  CN V: Facial sensation is intact to light touch in all 3 divisions bilaterally.  CN VII: Face is symmetric with normal eye closure and smile.  CN VII: Hearing is normal to rubbing fingers  CN IX, X: Palate elevates symmetrically. Phonation is normal.  CN XI: Head turning and shoulder shrug are intact  CN XII: Tongue is midline with normal movements and no atrophy.    Motor - Normal bulk and tone throughout. No pronator drift of out-stretched arms.  Strength testing            Deltoid      Biceps      Triceps     Wrist Extension    Wrist Flexion     Interossei         R            5                 5               5                     5                              5                        5                 5  L             5                 5               5                     5                              5                        5                 5              Hip Flexion    Hip Extension    Knee Flexion    Knee Extension    Dorsiflexion    Plantar Flexion  R              5                           5                       5                           5                            5                          5  L              5                           5                        5                           5                            5                          5    Sensation - Light touch/temperature/vibration intact in fingers and toes     DTR's -             Biceps      Triceps     Brachioradialis      Patellar    Ankle    Toes/plantar response  R             2+             2+                  2+                       2+            2+                 Down  L              2+             2+                 2+                        2+           2+                 Down    Coordination - Rapid alternating movements and fine finger movements are intact. There is no dysmetria on finger-to-nose and heel-knee-shin. There are no abnormal or extraneous movements.     Gait and station - Posture is normal. Gait is steady with slow steps. Heel and toe walking not performed due to hesitency. Tandem gait is normal.               CAPILLARY BLOOD GLUCOSE              CSF:                  Radiology:    < from: MR Head No Cont (02.09.23 @ 10:23) >  IMPRESSION: Abnormal T2 prolongation in the periventricular white matter   region as described above.    < end of copied text >   Neurology - Consult Note    -  Spectra: [id number] ([facility]), [phone] ([facility])  -    HPI: Patient VIVEK BURCIAGA is a 52y (1970) woman with no past medical history presenting with right sided headache, right sided facial pain, and bilateral upper extremity joint pain. Ms. Burciaga was in good health until early December when she was diagnosed with the flu and several other viruses. Since her diagnosis with the flu, she reports intermittent hypertension, bilateral elbow joint pain, right sided headaches, right sided facial pain and fatigue. She denies any history of hypertension prior to December and reports no family history of hypertension. Her blood pressures have been in the 140s-150s since December which is high for her. She began taking medication for hypertension on 2/3. Since having the flu, she also reports intermittent right sided facial pain that begins in the ears and extends throughout the right side of her face. She compares the pain to sinus pain that she has felt with prior respiratory illnesses. She denies eye pain or changes in vision with these episodes. She has also experienced headaches on her right side that are worsened with light exposure. She denies tearing or changes in vision with these headaches. Throughout this time she also reports intermittent bilateral arm pain that is most pronounced in her elbow joints but is also present in her hands. She states that the pain is sometimes accompanied by numbness. She also endorses changes in sensation of her feet bilaterally. Specifically she feels as if her feet are swollen when there aren't any signs of edema. Due to this sensation, she feels nervous when taking steps and walking. She has been seen in the ED and urgent care multiple times for these concerns. She denies chest pain, constipation, or diarrhea.  Of note, the patient has not had a period since November of 2022. Her periods were previously regular until March of 2022 when they became irregular. She states that her gynecologist believes she was perimenopausal since March and has now likely reached menopause. Prior to these new symptoms, Ms. Burciaga was very active reporting daily workouts with no fatigue, pain or hypertension. These symptoms have caused her a lot of anxiety and as a result, her PCP prescribed Lexapro for management on 2/9. The patient took one dose but did not continue taking it because it did not make her feel good.     Yesterday afternoon, the patient was driving to the hair salon when she began experiencing a right sided headache. Since she was concerned that the headache would progress to facial pain and arm pain, she pulled over and called her aunt who is a nurse for assistance. Although she did not measure her BP, she believed her symptoms may have been due to elevated blood pressures and took an extra blood pressure pill at that time. At 3am this morning, she felt the right sided facial pain and headache coming on again and came into the hospital.     Of note, the patient presented on 2/9/23 for posterior-occipital headache, mild R facial numbness, shoulder and mid L arm pain and lightheadedness. SBP to 180s. NIHSS 1. Impression was HA and numbness likely related to BP changes and possible sinus disease MR scan demonstrated Abnormal T2 prolongation in the periventricular white matter region but no infarcts. Departed on aspirin and statin. However, was only taking blood pressure medication by the time of the current admission.       Review of Systems:    CONSTITUTIONAL: No fevers or chills  EYES AND ENT: No visual changes   RESPIRATORY: No shortness of breath  CARDIOVASCULAR: No chest pain   GASTROINTESTINAL: No changes in bowel movement   NEUROLOGICAL: +As stated in HPI above  All other review of systems is negative unless indicated above.    Allergies:  penicillin (Other)      PMHx/PSHx/Family Hx: As above, otherwise see below   Asthma    Anemia    Knee injury    Female infertility    Tubal occlusion    Infertility, female        Social Hx:  No current use of tobacco, alcohol, or illicit drugs  The patient is a  and has felt that her recent symptoms are interfering with her work. She is very passionate about fitness and living a healthy lifestyle.     Medications:  MEDICATIONS  (STANDING):    MEDICATIONS  (PRN):      Vitals:  T(C): 37.1 (02-17-23 @ 07:24), Max: 37.1 (02-17-23 @ 07:24)  HR: 73 (02-17-23 @ 07:24) (73 - 99)  BP: 142/99 (02-17-23 @ 07:24) (142/99 - 142/99)  RR: 18 (02-17-23 @ 07:24) (18 - 18)  SpO2: 98% (02-17-23 @ 07:24) (98% - 100%)    Physical Examination:  General - NAD, pleasant, cooperative   Cardiovascular - Peripheral pulses palpable, no edema  Neurologic Exam:  Mental status - Awake, Alert, Oriented to person, place, and time. Speech fluent. Follows simple and complex commands. Attention/concentration, recent and remote memory (including registration and recall), and fund of knowledge intact.    Cranial nerves:  CN II: Visual fields are full to confrontation. Fundoscopic exam is normal with sharp discs. Pupils are 4 mm and briskly reactive to light.   CN III, IV, VI: EOMI, no nystagmus, no ptosis  CN V: Facial sensation is intact to light touch in all 3 divisions bilaterally.  CN VII: Face is symmetric with normal eye closure and smile.  CN VII: Hearing is normal to rubbing fingers  CN IX, X: Palate elevates symmetrically. Phonation is normal.  CN XI: Head turning and shoulder shrug are intact  CN XII: Tongue is midline with normal movements and no atrophy.    Motor - Normal bulk and tone throughout. No pronator drift of out-stretched arms.  Strength testing            Deltoid      Biceps      Triceps     Wrist Extension    Wrist Flexion     Interossei         R            5                 5               5                     5                              5                        5                 5  L             5                 5               5                     5                              5                        5                 5              Hip Flexion    Hip Extension    Knee Flexion    Knee Extension    Dorsiflexion    Plantar Flexion  R              5                           5                       5                           5                            5                          5  L              5                           5                        5                           5                            5                          5    Sensation - Light touch/temperature/vibration intact in fingers and toes     DTR's -             Biceps      Triceps     Brachioradialis      Patellar    Ankle    Toes/plantar response  R             2+             2+                  2+                       2+            2+                 Down  L              2+             2+                 2+                        2+           2+                 Down    Coordination - Rapid alternating movements and fine finger movements are intact. There is no dysmetria on finger-to-nose and heel-knee-shin. There are no abnormal or extraneous movements.     Gait and station - Posture is normal. Gait is steady with slow steps. Heel and toe walking not performed due to hesitency. Tandem gait is normal.               Radiology:    < from: MR Head No Cont (02.09.23 @ 10:23) >  IMPRESSION: Abnormal T2 prolongation in the periventricular white matter   region as described above.    < end of copied text >   Neurology - Consult Note    -  Spectra: [phone] ([facility]), [phone] ([facility])  -    HPI: Patient VIVEK BURCIAGA is a 52y (1970) woman with no past medical history presenting with right sided headache, right sided facial pain, and bilateral upper extremity joint pain. Ms. Burciaga was in good health until early December when she was diagnosed with the flu and several other viruses. Since her diagnosis with the flu, she reports intermittent hypertension, bilateral elbow joint pain, right sided headaches, right sided facial pain and fatigue. She denies any history of hypertension prior to December and reports no family history of hypertension. Her blood pressures have been in the 140s-150s since December which is high for her. She began taking medication for hypertension on 2/3. Since having the flu, she also reports intermittent right sided facial pain that begins in the ears and extends throughout the right side of her face. She compares the pain to sinus pain that she has felt with prior respiratory illnesses. She denies eye pain or changes in vision with these episodes. She has also experienced headaches on her right side that are worsened with light exposure. She denies tearing or changes in vision with these headaches. Throughout this time she also reports intermittent bilateral arm pain that is most pronounced in her elbow joints but is also present in her hands. She states that the pain is sometimes accompanied by numbness. She also endorses changes in sensation of her feet bilaterally. Specifically she feels as if her feet are swollen when there aren't any signs of edema. Due to this sensation, she feels nervous when taking steps and walking. She has been seen in the ED and urgent care multiple times for these concerns. She denies chest pain, constipation, or diarrhea.  Of note, the patient has not had a period since November of 2022. Her periods were previously regular until March of 2022 when they became irregular. She states that her gynecologist believes she was perimenopausal since March and has now likely reached menopause. Prior to these new symptoms, Ms. Burciaga was very active reporting daily workouts with no fatigue, pain or hypertension. These symptoms have caused her a lot of anxiety and as a result, her PCP prescribed Lexapro for management on 2/9. The patient took one dose but did not continue taking it because it did not make her feel good.     Yesterday afternoon, the patient was driving to the hair salon when she began experiencing a right sided headache. Since she was concerned that the headache would progress to facial pain and arm pain, she pulled over and called her aunt who is a nurse for assistance. Although she did not measure her BP, she believed her symptoms may have been due to elevated blood pressures and took an extra blood pressure pill at that time. At 3am this morning, she felt the right sided facial pain and headache coming on again and came into the hospital.     Of note, the patient presented on 2/9/23 for posterior-occipital headache, mild R facial numbness, shoulder and mid L arm pain and lightheadedness. SBP to 180s. NIHSS 1. Impression was HA and numbness likely related to BP changes and possible sinus disease MR scan demonstrated Abnormal T2 prolongation in the periventricular white matter region but no infarcts. Departed on aspirin and statin. However, was only taking blood pressure medication by the time of the current admission.       Review of Systems:    CONSTITUTIONAL: No fevers or chills  EYES AND ENT: No visual changes   RESPIRATORY: No shortness of breath  CARDIOVASCULAR: No chest pain   GASTROINTESTINAL: No changes in bowel movement   NEUROLOGICAL: +As stated in HPI above  All other review of systems is negative unless indicated above.    Allergies:  penicillin (Other)      PMHx/PSHx/Family Hx: As above, otherwise see below   Asthma    Anemia    Knee injury    Female infertility    Tubal occlusion    Infertility, female        Social Hx:  No current use of tobacco, alcohol, or illicit drugs  The patient is a  and has felt that her recent symptoms are interfering with her work. She is very passionate about fitness and living a healthy lifestyle.     Medications:  MEDICATIONS  (STANDING):    MEDICATIONS  (PRN):      Vitals:  T(C): 37.1 (02-17-23 @ 07:24), Max: 37.1 (02-17-23 @ 07:24)  HR: 73 (02-17-23 @ 07:24) (73 - 99)  BP: 142/99 (02-17-23 @ 07:24) (142/99 - 142/99)  RR: 18 (02-17-23 @ 07:24) (18 - 18)  SpO2: 98% (02-17-23 @ 07:24) (98% - 100%)    Physical Examination:  General - NAD, pleasant, cooperative   Cardiovascular - Peripheral pulses palpable, no edema  Neurologic Exam:  Mental status - Awake, Alert, Oriented to person, place, and time. Speech fluent. Follows simple and complex commands. Attention/concentration, recent and remote memory (including registration and recall), and fund of knowledge intact.    Cranial nerves:  CN II: Visual fields are full to confrontation. Fundoscopic exam is normal with sharp disc margins with normal color. Pupils are 4 mm and briskly reactive to light.   CN III, IV, VI: EOMI, no nystagmus, no ptosis  CN V: Facial sensation is intact to light touch in all 3 divisions bilaterally.  CN VII: Face is symmetric with normal eye closure and smile.  CN VII: Hearing is normal to rubbing fingers  CN IX, X: Palate elevates symmetrically. Phonation is normal.  CN XI: Head turning and shoulder shrug are intact  CN XII: Tongue is midline with normal movements and no atrophy.    Motor - Normal bulk and tone throughout. No pronator drift of out-stretched arms.  Strength testing            Deltoid      Biceps      Triceps     Wrist Extension    Wrist Flexion     Interossei         R            5                 5               5                     5                              5                        5                 5  L             5                 5               5                     5                              5                        5                 5              Hip Flexion    Hip Extension    Knee Flexion    Knee Extension    Dorsiflexion    Plantar Flexion  R              5                           5                       5                           5                            5                          5  L              5                           5                        5                           5                            5                          5    Sensation - Light touch/temperature/vibration intact in fingers and toes     DTR's -             Biceps      Triceps     Brachioradialis      Patellar    Ankle    Toes/plantar response  R             2+             2+                  2+                       2+            2+                 Down  L              2+             2+                 2+                        2+           2+                 Down    Coordination - Rapid alternating movements and fine finger movements are intact. There is no dysmetria on finger-to-nose and heel-knee-shin. There are no abnormal or extraneous movements.     Gait and station - Posture is normal. Gait is steady with slow steps. Heel and toe walking not performed due to hesitency. Tandem gait is normal.               Radiology:    < from: MR Head No Cont (02.09.23 @ 10:23) >  IMPRESSION: Abnormal T2 prolongation in the periventricular white matter   region as described above.    < end of copied text >

## 2023-02-17 NOTE — CONSULT NOTE ADULT - ASSESSMENT
ASSESSMENT       IMPRESSION       RECOMMENDATION         Patient to be seen by team and attending. Note finalized upon attending attestation.  ASSESSMENT   52F with no PMHx who presented with right sided headache, right sided facial pain, and bilateral upper extremity joint pain. Initially presented on 2/9 and seen by Stroke team. Started on aspirin and statin pending MRI. Also taking blood pressure medication which are new. Feels like fatigue is corroborating with high blood pressure. Decided to come to the hospital after feeling a headache coming on. Neurology consulted for right sided facial and head pain in light of MR imaging.     IMPRESSION     Right sided headache and facial pain with joint pains in bilateral upper extremities in olecranon area with bilateral lower extremity numbness, likely primary headache 2/2 sinus infection and L.E. peripheral neuropathy     RECOMMENDATION   [] Patient can follow up with Michael Geiger [address], [place] [address]. Phone number [phone]  [] Can consider EMG/NCS in outpatient setting with Dr. Jamie Guevara also at [address]   [] Symptomatic treatment for headache   [] f/u rheumatological labs     Patient to be seen by team and attending. Note finalized upon attending attestation.

## 2023-02-17 NOTE — ED ADULT NURSE NOTE - NSIMPLEMENTINTERV_GEN_ALL_ED
Implemented All Universal Safety Interventions:  [place] to call system. Call bell, personal items and telephone within reach. Instruct patient to call for assistance. Room bathroom lighting operational. Non-slip footwear when patient is off stretcher. Physically safe environment: no spills, clutter or unnecessary equipment. Stretcher in lowest position, wheels locked, appropriate side rails in place.

## 2023-02-17 NOTE — ED PROVIDER NOTE - OBJECTIVE STATEMENT
51 yo F with no previous PMHx, in this emergency department multiple time for similar sx. Pt started not feeling well in December first. 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 Prior to this, pt work out daily and is in good health.     Since December, pt experienced recurrent symptoms of dizziness, facial numbness (alternate side), flushed face, pain, and left sided chest pain with radiation down to left arm and hand. BP can be elevated to 180/100 during these episode. Repeat cardiac work up in this department, including echo done last week was unrevealing.     Head CT, CTA head and neck not indicative of stroke. Also has MR brain performed. showed "Extensive T2 prolongation in the appearance white matter region is identified. These findings are nonspecific and could be related to   underlying demyelinating disease though the possibility of migraine   headaches, infection, inflammation or ischemia must be considered as well."    Returned back to ED today for similar issue. Pt states she has been exhausted since december. Her "attacks" has increased from once every 2-3 days to daily. She used to have these episode more in the PM, but yesterday she had this in the AM. She does not feel safe driving and have to have her aunt picked her up. Also had bilateral elbow pain that started around december as well. No rash    PCP referred her to endocrine. But earliest opening is in end of march.

## 2023-02-17 NOTE — CONSULT NOTE ADULT - ATTENDING COMMENTS
About 2 months of right neck, head and facial pain and funny feeling in the soles of the feet.     Exam:  Fundus - disc margins sharp.  Normal PP and vibration sensation.   Reflexes: arms 1; legs 2.      A/P  Ms. Burciaga is a 53 yo woman with several issues.  - hypertension - brain imaging with posterior white matter, symmetric hyperintensities - management per ER and patients PMD  - pain - may be related to hypertension, no CNS lesion on recent imaging.   - symptoms of a peripheral neuropathy but no signs.   I agree with work up and management as above.   Thank you.

## 2023-02-17 NOTE — ED PROVIDER NOTE - ATTENDING CONTRIBUTION TO CARE
Health Care Proxy (HCP)
I performed a face-to-face evaluation of the patient and performed a history and physical examination. I agree with the history and physical examination. If this was a PA visit, I personally saw the patient with the PA and performed a substantive portion of the visit including all aspects of the medical decision making.    Luz Elena: For the last month, pt has had moving neuro complaints (L arm discomfort, R and L face discomfort (still has R face discomfort)) and occasional HTN (SBP ~150-160/; no h/o same). MR brain last wk. w/ suspicion of demyelinating disease. Cardiac w/u unremarkable. Will consult Neuro w/ question: benefit of MR brain w/ and w/o contrast and full spine MRI, and benefit of LP for oligoclonal bands.

## 2023-02-17 NOTE — ED PROVIDER NOTE - NS ED ROS FT
CONSTITUTIONAL: No fever or chill, +fatigue  HEENT: Denies changes in vision and hearing. pain to face  RESPIRATORY: Denies SOB and cough.  CV: chest pain  GI: Denies abdominal pain, nausea, vomiting and diarrhea.  : Denies dysuria and urinary frequency.  MSK: bilateral elbow pain  SKIN: Denies rash   NEUROLOGICAL: dizziness, numbness and weakness in arm, numbness in face (alternating side)

## 2023-02-17 NOTE — ED PROVIDER NOTE - PHYSICAL EXAMINATION
GENERAL: Alert. No acute distress. tired appearing  EYES: EOMI grossly normal. Anicteric.   HENT: Moist mucous membranes.   RESP: No conversation dyspnea, no resp distress, CTAB   CARDIOVASCULAR: RRR, no m/g/r  ABDOMEN: soft, non distended, nttp  MSK: ROM grossly normal in all 4 extremities. No deformities  SKIN: warm and dry  NEUROLOGIC: Alert and oriented x3, CN II to XII intact. 5/5 strength in all 4 extremities. Sensation grossly intact in all 4 extremities. Normal finger-to-nose and heel-to-shin bilaterally.  PSYCHIATRIC: Cooperative. Appropriate mood and affect

## 2023-02-17 NOTE — ED ADULT TRIAGE NOTE - CHIEF COMPLAINT QUOTE
Pt c/o persistent palpitations and intermittent chest pain. States BP has been high also. Reports multiple ED visits for same complaint, states doctors are missing something and unable to give her proper diagnosis. Denies chest pain, dizziness or SOB at this time.  Denies PMH

## 2023-02-17 NOTE — ED ADULT NURSE NOTE - OBJECTIVE STATEMENT
Leila RN: Pt received in 23A. Pt 52Y F aox4, ambulatory. Pt arrives c/o palpitations and intermittent chest pain. Endorsing intermittent numbness/ tingling to b/l arms and joint pain. Pt states has had multiple visits for same complaint with negative work ups. pt endorsing intermittent high BP. Denies PMHX/meds. Denies current fever, chest pain, n/v/d. MD at bedside for eval, report to primary RN.

## 2023-02-17 NOTE — ED ADULT NURSE REASSESSMENT NOTE - NS ED NURSE REASSESS COMMENT FT1
RN attempt to place pt on cardiac monitor, but pt refused saying, "I will wait until I can get a room. im not putting on gown in the hallway", pt has blinds and curtains but had refused robing in hospital gown twice.

## 2023-02-17 NOTE — ED PROVIDER NOTE - NSFOLLOWUPINSTRUCTIONS_ED_ALL_ED_FT
Your evaluation today shows no emergent findings at this time requiring hospital admission.     Please follow-up with your primary care doctor and bring your results from today.    Please follow-up with Neurology Dr. Geiger in 1 week. Please call the number provided to schedule an appointment, number provided on paperwork.    Take Tylenol and/or Ibuprofen every 4-6 hours as needed for pain.    ***Return to the ED if you have any new or worsening symptoms*** Your evaluation today shows no emergent findings at this time requiring hospital admission.     Please follow-up with your primary care doctor and bring your results from today. Some of the results are not back at this time. Please call the hospital in 1-2 days to follow-up the labs and talk to your primary doctor about the results.     Please follow-up with Neurology Dr. Geiger in 1 week. Please call the number provided to schedule an appointment, number provided on paperwork.    Take Tylenol and/or Ibuprofen every 4-6 hours as needed for pain.    ***Return to the ED if you have any new or worsening symptoms***

## 2023-02-17 NOTE — ED PROVIDER NOTE - CARE PROVIDER_API CALL
Michael Geiger)  Neurology; Vascular Neurology  [address], [address]  [place] [address]  Phone: [phone]  Fax: [phone]  Follow Up Time:

## 2023-02-17 NOTE — ED PROVIDER NOTE - PATIENT PORTAL LINK FT
You can access the FollowMyHealth Patient Portal offered by [facility] by registering at the following website: [email or web address]. By joining [employer]’s FollowMyHealth portal, you will also be able to view your health information using other applications (apps) compatible with our system.

## 2023-02-18 LAB — DSDNA AB SER-ACNC: <12 IU/ML — SIGNIFICANT CHANGE UP

## 2023-02-19 NOTE — ED POST DISCHARGE NOTE - REASON FOR FOLLOW-UP
Other Vit B12 :1075elevated and folate also elevated >20.0 message left with Call Back  P.A. number and hours for return call back.

## 2023-02-19 NOTE — ED POST DISCHARGE NOTE - DETAILS
Caleb - pt called for results to be faxed to her pcp. documents were faxed to the number provided by the pt

## 2023-02-20 LAB
ANA PAT FLD IF-IMP: SIGNIFICANT CHANGE UP
ANA TITR SER: ABNORMAL

## 2023-02-22 ENCOUNTER — APPOINTMENT (OUTPATIENT)
Dept: ENDOCRINOLOGY | Facility: CLINIC | Age: 53
End: 2023-02-22
Payer: COMMERCIAL

## 2023-02-22 ENCOUNTER — NON-APPOINTMENT (OUTPATIENT)
Age: 53
End: 2023-02-22

## 2023-02-22 VITALS
SYSTOLIC BLOOD PRESSURE: 123 MMHG | HEIGHT: 64 IN | DIASTOLIC BLOOD PRESSURE: 81 MMHG | OXYGEN SATURATION: 98 % | BODY MASS INDEX: 28.85 KG/M2 | HEART RATE: 83 BPM | WEIGHT: 169 LBS

## 2023-02-22 DIAGNOSIS — I15.2 HYPERTENSION SECONDARY TO ENDOCRINE DISORDERS: ICD-10-CM

## 2023-02-22 DIAGNOSIS — I10 ESSENTIAL (PRIMARY) HYPERTENSION: ICD-10-CM

## 2023-02-22 PROCEDURE — 99203 OFFICE O/P NEW LOW 30 MIN: CPT | Mod: 25

## 2023-02-28 LAB
ALBUMIN SERPL ELPH-MCNC: 4.5 G/DL
ALDOSTERONE SERUM: 21.5 NG/DL
ALP BLD-CCNC: 63 U/L
ALT SERPL-CCNC: 15 U/L
ANION GAP SERPL CALC-SCNC: 10 MMOL/L
AST SERPL-CCNC: 18 U/L
BILIRUB SERPL-MCNC: 0.6 MG/DL
BUN SERPL-MCNC: 9 MG/DL
CALCIUM SERPL-MCNC: 10 MG/DL
CALCIUM SERPL-MCNC: 10 MG/DL
CHLORIDE SERPL-SCNC: 101 MMOL/L
CO2 SERPL-SCNC: 25 MMOL/L
CREAT SERPL-MCNC: 0.83 MG/DL
EGFR: 84 ML/MIN/1.73M2
FSH SERPL-MCNC: 17.1 IU/L
GLUCOSE SERPL-MCNC: 95 MG/DL
LH SERPL-ACNC: 8.1 IU/L
PARATHYROID HORMONE INTACT: 38 PG/ML
POTASSIUM SERPL-SCNC: 4.7 MMOL/L
PROT SERPL-MCNC: 7.7 G/DL
SODIUM SERPL-SCNC: 136 MMOL/L
TSH SERPL-ACNC: 2.08 UIU/ML

## 2023-03-01 ENCOUNTER — NON-APPOINTMENT (OUTPATIENT)
Age: 53
End: 2023-03-01

## 2023-03-06 NOTE — HISTORY OF PRESENT ILLNESS
[FreeTextEntry1] : VIVEK ADLER  is a 52 year old female with past medical history of HTN who presents today for evaluation of endocrine hypertension. \par \par Diagnosed with hypertension recently in the past 1 month. She is unsure if her BP is related to her hormones. She is taking Amlodipine 5mg (takes 1.5 tablet) daily. She does mostly vegan diet, low salt, eats vegetables and fruits. She is taking multivitamins. \par She notes her BP fluctuates, often has headaches, facial twitching, numbness of arm, been in and out of hospital 5 times, saw cardiologist who did workup which was reportedly negative.\par \par She used to check BP at home, notes at home usually systolic ranging 112-140s. She denies chest pain or shortness of breath, palpitations.  She notes irregular menstrual cycles, LMP is 11/10/22. She notes recently just got another period now, Feb 18th.  She notes female family members usually have menopausal later.\par \par Family Hx: No known DM or thyroid disease

## 2023-03-06 NOTE — ASSESSMENT
[FreeTextEntry1] : Patient presents for evaluation of endocrine hypertension\par She is on one antihypertensive medication, BP today is noted 123/81\par She notes BP at home usually ranges systolic 100-120s. Does not appear to have uncontrolled hypertension, low suspicion for endocrine causes of hypertension\par Denies chest pain, palpitations, or shortness of breath, has numbness in ear, denies chest pain or shortness of breath.\par She is considering to see therapist due to recent stressors in her life. \par Bloodwork was collected in office today, will f/u results accordingly. \par \par Answered all questions today; patient verbalized understanding of the above\par RTC depending on workup\par

## 2023-03-28 NOTE — PHYSICAL EXAM
03/28/2023  Nicholas Galvin is a 63 y.o., male.      Pre-op Assessment    I have reviewed the Patient Summary Reports.     I have reviewed the Nursing Notes. I have reviewed the NPO Status.   I have reviewed the Medications.     Review of Systems  Anesthesia Hx:  Denies Family Hx of Anesthesia complications.   Denies Personal Hx of Anesthesia complications.   Social:  Former Smoker Last smokeless tobacco about 2 weeks ago.   Hematology/Oncology:  Hematology Normal   Oncology Normal     EENT/Dental:   Glaucoma. Upper and lower partial plates, both at home.   Cardiovascular:   Hypertension Denies MI. CAD  asymptomatic CABG/stent   Denies CHF. hyperlipidemia Patient cleared for colonoscopy by his cardiologist a few days ago.   Pulmonary:  Pulmonary Normal    Renal/:  Renal/ Normal     Hepatic/GI:  Hepatic/GI Normal    Musculoskeletal:  Musculoskeletal Normal    Neurological:  Neurology Normal    Endocrine:   History of partial thyroidectomy, benign. Euthyroid per patient.   Psych:  Psychiatric Normal           Physical Exam  General: Well nourished, Cooperative, Alert and Oriented    Airway:  Mallampati: III   Mouth Opening: Normal  TM Distance: > 6 cm  Tongue: Normal  Neck ROM: Normal ROM    Dental:  Intact    Chest/Lungs:  Clear to auscultation, Normal Respiratory Rate    Heart:  Rate: Normal  Rhythm: Regular Rhythm  Sounds: Normal        Anesthesia Plan  Type of Anesthesia, risks & benefits discussed:    Anesthesia Type: Gen Natural Airway  Intra-op Monitoring Plan: Standard ASA Monitors  Induction:  IV  Informed Consent: Informed consent signed with the Patient and all parties understand the risks and agree with anesthesia plan.  All questions answered.   ASA Score: 3    Ready For Surgery From Anesthesia Perspective.     .       [Alert] : alert [No Acute Distress] : no acute distress [Well Developed] : well developed [Normal Sclera/Conjunctiva] : normal sclera/conjunctiva [EOMI] : extra ocular movement intact [No Proptosis] : no proptosis [No Lid Lag] : no lid lag

## 2023-04-20 LAB — RENIN ACTIVITY, PLASMA: 1.7

## 2024-02-12 ENCOUNTER — APPOINTMENT (OUTPATIENT)
Dept: ORTHOPEDIC SURGERY | Facility: CLINIC | Age: 54
End: 2024-02-12
Payer: COMMERCIAL

## 2024-02-12 DIAGNOSIS — M23.203 DERANGEMENT OF UNSPECIFIED MEDIAL MENISCUS DUE TO OLD TEAR OR INJURY, RIGHT KNEE: ICD-10-CM

## 2024-02-12 PROCEDURE — 99214 OFFICE O/P EST MOD 30 MIN: CPT

## 2024-02-23 PROBLEM — M23.203 DEGENERATIVE TEAR OF MEDIAL MENISCUS OF RIGHT KNEE: Status: ACTIVE | Noted: 2024-02-23

## 2024-02-23 NOTE — HISTORY OF PRESENT ILLNESS
[de-identified] : 53 year old female presents today with right knee pain since December 2023. Patient hit medial aspect of her knee to table leg. She hit her knee a few more times and notice pain and swelling a few days later. She was seen by PMD and given NSAIDS which did not relieve pain. She continued to have pain MRI was ordered which showed MMT. Patient started PT in January which has helped with swelling.  The pain is constant worse with walking and standing. Aleve provides soem relief. Uses cane for ambulation.

## 2024-02-23 NOTE — ADDENDUM
[FreeTextEntry1] : This note was written by Mariana Burrell on 02/12/2024 acting solely as a scribe for Dr. Oswaldo Valdez.  All medical record entries made by the Scribe were at my, Dr. Oswaldo Valdez, direction and personally dictated by me on 02/12/2024. I have personally reviewed the chart and agree that the record accurately reflects my personal performance of the history, physical exam, assessment and plan.

## 2024-02-23 NOTE — DISCUSSION/SUMMARY
[de-identified] : 52 y/o female with right knee root MMT/osteoarthritis.   Patient presents for evaluation of right knee pain since December.  Patient has medial arthrosis with evidence of a high-grade root tear of the medial meniscus.  We discussed that this is a nonsurgical condition, and will require prolonged symptomatic management.  This will also progress to further arthrosis.  I discussed the treatment of degenerative arthritis with the patient at length today, as well as the chronic degenerative process and likely future progression of the disease. Pain may be related to the bony degenerative changes seen on XR imaging, or the associated degeneration to the soft tissues within the knee joint, including cartilage, meniscus, or ligamentous structures.  I described the spectrum of treatment options available including nonoperative modalities to total joint arthroplasty. Noninvasive and nonoperative treatment modalities include weight reduction, activity modification with low impact exercise, PRN use of acetaminophen or anti-inflammatory medication, natural anti-inflammatory supplements, glucosamine/chondroitin, and physical therapy (for strengthening and conditioning). More invasive treatments can include injection therapies; including cortisone, hyaluronic acid (visco-supplementation), or platelet-rich-plasma (PRP) injections. Definitive treatment could also include future total joint arthroplasty if symptoms persist and cause prolonged disability or interference with activities of daily living.   The risks and benefits of each treatment option was discussed and all questions were answered. At this time recommendations are for conservative and symptomatic care as detailed above with impact loading activity restriction, low impact exercise and avoidance of strenuous activity.   Other recommendations include OTC NSAIDs or acetaminophen (if tolerated/able), with application of ice to the knee 2-3x daily for 20 minutes or after periods of activity. Begin trial of PT, Rx given.   Follow up as needed.

## 2024-02-23 NOTE — PHYSICAL EXAM
[de-identified] : Right Knee Exam:   Skin: Clean, dry, intact Inspection: No obvious malalignment, no masses, no swelling, no effusion Pulses: 2+ DP/PT pulses ROM: 0-90 degrees of flexion. No pain with deep knee flexion/extension. Tenderness: No MJLT. No LJLT. No pain over the patella facets. No pain to the quadriceps tendon. No pain to the patella tendon. No posterior knee tenderness. Stability: Stable to varus, valgus. Negative Lachman testing. Negative anterior drawer, negative posterior drawer. Strength: 5/5 Q/H/TA/GS/EHL, without atrophy Neuro: Intact to light touch throughout, DTRs normal Additional Tests: Negative Michael's test, Negative patellar grind test Other findings: None. [de-identified] : Images were reviewed.  4 views of the right knee that show no acute fracture or dislocation. There is moderate medial, moderate lateral and moderate patellofemoral degenerative changes seen. There is no significant malalignment. No significant other obvious osseous abnormality, otherwise unremarkable.   MRI right knee dated 02/02/2024 shows degenerative root MMT. Arthrosis most noted in the medial joint.

## 2024-02-24 ENCOUNTER — APPOINTMENT (OUTPATIENT)
Dept: MAMMOGRAPHY | Facility: CLINIC | Age: 54
End: 2024-02-24

## 2024-02-29 ENCOUNTER — APPOINTMENT (OUTPATIENT)
Dept: MAMMOGRAPHY | Facility: CLINIC | Age: 54
End: 2024-02-29
Payer: COMMERCIAL

## 2024-02-29 PROCEDURE — 77063 BREAST TOMOSYNTHESIS BI: CPT

## 2024-02-29 PROCEDURE — 77067 SCR MAMMO BI INCL CAD: CPT

## 2024-03-04 ENCOUNTER — APPOINTMENT (OUTPATIENT)
Dept: ORTHOPEDIC SURGERY | Facility: CLINIC | Age: 54
End: 2024-03-04
Payer: COMMERCIAL

## 2024-03-04 DIAGNOSIS — M17.11 UNILATERAL PRIMARY OSTEOARTHRITIS, RIGHT KNEE: ICD-10-CM

## 2024-03-04 PROCEDURE — 99214 OFFICE O/P EST MOD 30 MIN: CPT

## 2024-03-04 RX ORDER — NAPROXEN 500 MG/1
500 TABLET ORAL TWICE DAILY
Qty: 100 | Refills: 0 | Status: ACTIVE | COMMUNITY
Start: 2024-03-04 | End: 1900-01-01

## 2024-03-08 ENCOUNTER — OUTPATIENT (OUTPATIENT)
Dept: OUTPATIENT SERVICES | Facility: HOSPITAL | Age: 54
LOS: 1 days | End: 2024-03-08
Payer: COMMERCIAL

## 2024-03-08 ENCOUNTER — APPOINTMENT (OUTPATIENT)
Dept: MAMMOGRAPHY | Facility: CLINIC | Age: 54
End: 2024-03-08
Payer: COMMERCIAL

## 2024-03-08 ENCOUNTER — APPOINTMENT (OUTPATIENT)
Dept: ULTRASOUND IMAGING | Facility: CLINIC | Age: 54
End: 2024-03-08
Payer: COMMERCIAL

## 2024-03-08 DIAGNOSIS — N97.9 FEMALE INFERTILITY, UNSPECIFIED: Chronic | ICD-10-CM

## 2024-03-08 DIAGNOSIS — Z00.8 ENCOUNTER FOR OTHER GENERAL EXAMINATION: ICD-10-CM

## 2024-03-08 DIAGNOSIS — Z98.89 OTHER SPECIFIED POSTPROCEDURAL STATES: Chronic | ICD-10-CM

## 2024-03-08 DIAGNOSIS — R92.8 OTHER ABNORMAL AND INCONCLUSIVE FINDINGS ON DIAGNOSTIC IMAGING OF BREAST: ICD-10-CM

## 2024-03-08 PROCEDURE — 76642 ULTRASOUND BREAST LIMITED: CPT

## 2024-03-08 PROCEDURE — 77065 DX MAMMO INCL CAD UNI: CPT

## 2024-03-08 PROCEDURE — G0279: CPT | Mod: 26

## 2024-03-08 PROCEDURE — 76642 ULTRASOUND BREAST LIMITED: CPT | Mod: 26,LT

## 2024-03-08 PROCEDURE — 77065 DX MAMMO INCL CAD UNI: CPT | Mod: 26,LT

## 2024-03-08 PROCEDURE — G0279: CPT

## 2024-03-08 PROCEDURE — 77061 BREAST TOMOSYNTHESIS UNI: CPT | Mod: 26,LT

## 2024-03-11 PROBLEM — M17.11 PRIMARY LOCALIZED OSTEOARTHRITIS OF RIGHT KNEE: Status: ACTIVE | Noted: 2024-03-11

## 2024-03-11 NOTE — ADDENDUM
[FreeTextEntry1] : This note was written by Analilia Lindsey on 03/04/2024 acting solely as a scribe for Dr. Oswaldo Valdez.  All medical record entries made by the Scribe were at my, Dr. Oswaldo Valdez, direction and personally dictated by me on 03/04/2024. I have personally reviewed the chart and agree that the record accurately reflects my personal performance of the history, physical exam, assessment and plan.

## 2024-03-11 NOTE — DISCUSSION/SUMMARY
[de-identified] : 54 y/o female with right knee root MMT/osteoarthritis.   Patient presents for further evaluation of right knee pain consistent with root tear medial meniscus and osteoarthritic disease.  Again the risks and benefits of each treatment option was discussed and all questions were answered. At this time recommendations are for continued conservative and symptomatic care as detailed above with impact loading activity restriction, low impact exercise and avoidance of strenuous activity.   Recommendation: Medial  bracing, NSAIDs as prescribed.  Ice as needed.  Activity modification.  Follow up 3 months

## 2024-03-11 NOTE — PHYSICAL EXAM
[de-identified] : Images were reviewed.  4 views of the right knee that show no acute fracture or dislocation. There is moderate medial, moderate lateral and moderate patellofemoral degenerative changes seen. There is no significant malalignment. No significant other obvious osseous abnormality, otherwise unremarkable.   MRI right knee dated 02/02/2024 shows degenerative root MMT. Arthrosis most noted in the medial joint.  [de-identified] : Right Knee Exam:   Skin: Clean, dry, intact Inspection: No obvious malalignment, no masses, no swelling, no effusion Pulses: 2+ DP/PT pulses ROM: 0-90 degrees of flexion. No pain with deep knee flexion/extension. Tenderness: No MJLT. No LJLT. No pain over the patella facets. No pain to the quadriceps tendon. No pain to the patella tendon. No posterior knee tenderness. Stability: Stable to varus, valgus. Negative Lachman testing. Negative anterior drawer, negative posterior drawer. Strength: 5/5 Q/H/TA/GS/EHL, without atrophy Neuro: Intact to light touch throughout, DTRs normal Additional Tests: Negative Michael's test, Negative patellar grind test Other findings: Wearing knee brace.

## 2024-03-11 NOTE — HISTORY OF PRESENT ILLNESS
[de-identified] : 53 year old female presents today for follow up of right knee root MMT/osteoarthritis. She did PT 2x per week after last visit with temporary relief. She received steroid injection 2/12/24  which gave her some relief. She hit her knee a few more times and notice pain and swelling a few days later.  The pain is constant worse with walking and standing. Aleve provides some relief. Uses cane for ambulation.

## 2024-07-15 ENCOUNTER — APPOINTMENT (OUTPATIENT)
Dept: RADIOLOGY | Facility: CLINIC | Age: 54
End: 2024-07-15
Payer: COMMERCIAL

## 2024-07-15 PROCEDURE — 72100 X-RAY EXAM L-S SPINE 2/3 VWS: CPT

## 2024-07-24 ENCOUNTER — APPOINTMENT (OUTPATIENT)
Dept: ORTHOPEDIC SURGERY | Facility: CLINIC | Age: 54
End: 2024-07-24
Payer: COMMERCIAL

## 2024-07-24 DIAGNOSIS — M23.203 DERANGEMENT OF UNSPECIFIED MEDIAL MENISCUS DUE TO OLD TEAR OR INJURY, RIGHT KNEE: ICD-10-CM

## 2024-07-24 PROCEDURE — 99213 OFFICE O/P EST LOW 20 MIN: CPT

## 2024-07-24 NOTE — DISCUSSION/SUMMARY
[de-identified] : 52 y/o female with right knee root MMT/osteoarthritis.   Patient presents for further evaluation of right knee pain consistent with root tear medial meniscus and osteoarthritic disease. Clinically, symptoms have significantly improved and recommendations are for continued conservative and symptomatic care as detailed previously.  Recommendation: Bracing as needed, Ice/NSAIDs prn. HEP as needed.   Follow up as needed

## 2024-07-24 NOTE — DISCUSSION/SUMMARY
[de-identified] : 52 y/o female with right knee root MMT/osteoarthritis.   Patient presents for further evaluation of right knee pain consistent with root tear medial meniscus and osteoarthritic disease. Clinically, symptoms have significantly improved and recommendations are for continued conservative and symptomatic care as detailed previously.  Recommendation: Bracing as needed, Ice/NSAIDs prn. HEP as needed.   Follow up as needed

## 2024-07-24 NOTE — PHYSICAL EXAM
[de-identified] : Right Knee Exam:   Skin: Clean, dry, intact Inspection: No obvious malalignment, no masses, no swelling, no effusion Pulses: 2+ DP/PT pulses ROM: 0-125 degrees of flexion. No pain with deep knee flexion/extension. Patellofemoral crepitus with ROM Tenderness: No MJLT. No LJLT. No pain over the patella facets. No pain to the quadriceps tendon. No pain to the patella tendon. No posterior knee tenderness. Stability: Stable to varus, valgus. Negative Lachman testing. Negative anterior drawer, negative posterior drawer. Strength: 5/5 Q/H/TA/GS/EHL, without atrophy Neuro: Intact to light touch throughout, DTRs normal Additional Tests: Negative Michael's test, Negative patellar grind test [de-identified] : Images were reviewed.  4 views of the right knee that show no acute fracture or dislocation. There is moderate medial, moderate lateral and moderate patellofemoral degenerative changes seen. There is no significant malalignment. No significant other obvious osseous abnormality, otherwise unremarkable.   MRI right knee dated 02/02/2024 shows degenerative root MMT. Arthrosis most noted in the medial joint.

## 2024-07-24 NOTE — ADDENDUM
[FreeTextEntry1] : This note was written by Cherelle Pierce on 07/24/2024 acting solely as a scribe for Dr. Oswaldo Valdez.  All medical record entries made by the Scribe were at my, Dr. Oswaldo Valdez, direction and personally dictated by me on 07/24/2024. I have personally reviewed the chart and agree that the record accurately reflects my personal performance of the history, physical exam, assessment and plan.

## 2024-07-24 NOTE — PHYSICAL EXAM
[de-identified] : Right Knee Exam:   Skin: Clean, dry, intact Inspection: No obvious malalignment, no masses, no swelling, no effusion Pulses: 2+ DP/PT pulses ROM: 0-125 degrees of flexion. No pain with deep knee flexion/extension. Patellofemoral crepitus with ROM Tenderness: No MJLT. No LJLT. No pain over the patella facets. No pain to the quadriceps tendon. No pain to the patella tendon. No posterior knee tenderness. Stability: Stable to varus, valgus. Negative Lachman testing. Negative anterior drawer, negative posterior drawer. Strength: 5/5 Q/H/TA/GS/EHL, without atrophy Neuro: Intact to light touch throughout, DTRs normal Additional Tests: Negative Michael's test, Negative patellar grind test [de-identified] : Images were reviewed.  4 views of the right knee that show no acute fracture or dislocation. There is moderate medial, moderate lateral and moderate patellofemoral degenerative changes seen. There is no significant malalignment. No significant other obvious osseous abnormality, otherwise unremarkable.   MRI right knee dated 02/02/2024 shows degenerative root MMT. Arthrosis most noted in the medial joint.

## 2024-07-24 NOTE — HISTORY OF PRESENT ILLNESS
[de-identified] : 53-year-old female is here for a follow-up regarding her right knee root MMT/osteoarthritis. Following her last visit, she underwent physical therapy three times weekly, experiencing temporary relief. She discontinued therapy one week ago and has since been exercising independently, noting significant improvement in pain since her last appointment, rating it a 3/10. She mentions clicking sensations while walking. A steroid injection administered on 2/12/24 provided some relief, but she continues to experience intermittent pain while walking. She finds partial relief with Aleve and uses a brace for support as needed

## 2024-07-24 NOTE — HISTORY OF PRESENT ILLNESS
[de-identified] : 53-year-old female is here for a follow-up regarding her right knee root MMT/osteoarthritis. Following her last visit, she underwent physical therapy three times weekly, experiencing temporary relief. She discontinued therapy one week ago and has since been exercising independently, noting significant improvement in pain since her last appointment, rating it a 3/10. She mentions clicking sensations while walking. A steroid injection administered on 2/12/24 provided some relief, but she continues to experience intermittent pain while walking. She finds partial relief with Aleve and uses a brace for support as needed

## 2024-09-28 ENCOUNTER — OUTPATIENT (OUTPATIENT)
Dept: OUTPATIENT SERVICES | Facility: HOSPITAL | Age: 54
LOS: 1 days | End: 2024-09-28
Payer: COMMERCIAL

## 2024-09-28 ENCOUNTER — APPOINTMENT (OUTPATIENT)
Dept: MAMMOGRAPHY | Facility: CLINIC | Age: 54
End: 2024-09-28
Payer: COMMERCIAL

## 2024-09-28 ENCOUNTER — APPOINTMENT (OUTPATIENT)
Dept: ULTRASOUND IMAGING | Facility: CLINIC | Age: 54
End: 2024-09-28
Payer: COMMERCIAL

## 2024-09-28 DIAGNOSIS — Z98.89 OTHER SPECIFIED POSTPROCEDURAL STATES: Chronic | ICD-10-CM

## 2024-09-28 DIAGNOSIS — Z00.8 ENCOUNTER FOR OTHER GENERAL EXAMINATION: ICD-10-CM

## 2024-09-28 DIAGNOSIS — Z00.00 ENCOUNTER FOR GENERAL ADULT MEDICAL EXAMINATION WITHOUT ABNORMAL FINDINGS: ICD-10-CM

## 2024-09-28 DIAGNOSIS — N97.9 FEMALE INFERTILITY, UNSPECIFIED: Chronic | ICD-10-CM

## 2024-09-28 PROCEDURE — 76642 ULTRASOUND BREAST LIMITED: CPT

## 2024-09-28 PROCEDURE — 77061 BREAST TOMOSYNTHESIS UNI: CPT | Mod: 26,LT

## 2024-09-28 PROCEDURE — 77065 DX MAMMO INCL CAD UNI: CPT | Mod: 26,LT

## 2024-09-28 PROCEDURE — G0279: CPT | Mod: 26

## 2024-09-28 PROCEDURE — G0279: CPT

## 2024-09-28 PROCEDURE — 77065 DX MAMMO INCL CAD UNI: CPT

## 2024-09-28 PROCEDURE — 76642 ULTRASOUND BREAST LIMITED: CPT | Mod: 26,LT

## 2024-10-14 ENCOUNTER — APPOINTMENT (OUTPATIENT)
Dept: RADIOLOGY | Facility: CLINIC | Age: 54
End: 2024-10-14
Payer: COMMERCIAL

## 2024-10-14 ENCOUNTER — OUTPATIENT (OUTPATIENT)
Dept: OUTPATIENT SERVICES | Facility: HOSPITAL | Age: 54
LOS: 1 days | End: 2024-10-14
Payer: COMMERCIAL

## 2024-10-14 DIAGNOSIS — Z98.89 OTHER SPECIFIED POSTPROCEDURAL STATES: Chronic | ICD-10-CM

## 2024-10-14 DIAGNOSIS — N97.9 FEMALE INFERTILITY, UNSPECIFIED: Chronic | ICD-10-CM

## 2024-10-14 DIAGNOSIS — Z00.8 ENCOUNTER FOR OTHER GENERAL EXAMINATION: ICD-10-CM

## 2024-10-14 PROCEDURE — 73030 X-RAY EXAM OF SHOULDER: CPT

## 2024-10-14 PROCEDURE — 73030 X-RAY EXAM OF SHOULDER: CPT | Mod: 26,LT

## 2024-11-11 ENCOUNTER — APPOINTMENT (OUTPATIENT)
Dept: ORTHOPEDIC SURGERY | Facility: CLINIC | Age: 54
End: 2024-11-11
Payer: COMMERCIAL

## 2024-11-11 ENCOUNTER — NON-APPOINTMENT (OUTPATIENT)
Age: 54
End: 2024-11-11

## 2024-11-11 VITALS — HEIGHT: 64 IN | BODY MASS INDEX: 29.37 KG/M2 | WEIGHT: 172 LBS

## 2024-11-11 DIAGNOSIS — M62.461 CONTRACTURE OF MUSCLE, RIGHT LOWER LEG: ICD-10-CM

## 2024-11-11 DIAGNOSIS — M79.674 PAIN IN RIGHT TOE(S): ICD-10-CM

## 2024-11-11 DIAGNOSIS — M21.41 FLAT FOOT [PES PLANUS] (ACQUIRED), RIGHT FOOT: ICD-10-CM

## 2024-11-11 PROCEDURE — 99213 OFFICE O/P EST LOW 20 MIN: CPT

## 2024-11-11 PROCEDURE — 73630 X-RAY EXAM OF FOOT: CPT | Mod: RT

## 2024-12-03 ENCOUNTER — EMERGENCY (EMERGENCY)
Facility: HOSPITAL | Age: 54
LOS: 1 days | Discharge: HOME | End: 2024-12-03
Attending: EMERGENCY MEDICINE | Admitting: EMERGENCY MEDICINE
Payer: COMMERCIAL

## 2024-12-03 VITALS
DIASTOLIC BLOOD PRESSURE: 96 MMHG | OXYGEN SATURATION: 100 % | HEART RATE: 77 BPM | RESPIRATION RATE: 16 BRPM | SYSTOLIC BLOOD PRESSURE: 153 MMHG | WEIGHT: 173.06 LBS | TEMPERATURE: 98 F

## 2024-12-03 VITALS
DIASTOLIC BLOOD PRESSURE: 90 MMHG | HEART RATE: 66 BPM | TEMPERATURE: 97 F | RESPIRATION RATE: 15 BRPM | OXYGEN SATURATION: 100 % | SYSTOLIC BLOOD PRESSURE: 136 MMHG

## 2024-12-03 DIAGNOSIS — N97.9 FEMALE INFERTILITY, UNSPECIFIED: Chronic | ICD-10-CM

## 2024-12-03 DIAGNOSIS — Z98.89 OTHER SPECIFIED POSTPROCEDURAL STATES: Chronic | ICD-10-CM

## 2024-12-03 LAB
ALBUMIN SERPL ELPH-MCNC: 4.4 G/DL — SIGNIFICANT CHANGE UP (ref 3.3–5)
ALP SERPL-CCNC: 66 U/L — SIGNIFICANT CHANGE UP (ref 40–120)
ALT FLD-CCNC: 15 U/L — SIGNIFICANT CHANGE UP (ref 4–33)
ANION GAP SERPL CALC-SCNC: 11 MMOL/L — SIGNIFICANT CHANGE UP (ref 7–14)
AST SERPL-CCNC: 24 U/L — SIGNIFICANT CHANGE UP (ref 4–32)
BASOPHILS # BLD AUTO: 0.03 K/UL — SIGNIFICANT CHANGE UP (ref 0–0.2)
BASOPHILS NFR BLD AUTO: 0.7 % — SIGNIFICANT CHANGE UP (ref 0–2)
BILIRUB SERPL-MCNC: 0.6 MG/DL — SIGNIFICANT CHANGE UP (ref 0.2–1.2)
BUN SERPL-MCNC: 14 MG/DL — SIGNIFICANT CHANGE UP (ref 7–23)
CALCIUM SERPL-MCNC: 9.7 MG/DL — SIGNIFICANT CHANGE UP (ref 8.4–10.5)
CHLORIDE SERPL-SCNC: 102 MMOL/L — SIGNIFICANT CHANGE UP (ref 98–107)
CO2 SERPL-SCNC: 26 MMOL/L — SIGNIFICANT CHANGE UP (ref 22–31)
CREAT SERPL-MCNC: 0.62 MG/DL — SIGNIFICANT CHANGE UP (ref 0.5–1.3)
EGFR: 106 ML/MIN/1.73M2 — SIGNIFICANT CHANGE UP
EOSINOPHIL # BLD AUTO: 0.18 K/UL — SIGNIFICANT CHANGE UP (ref 0–0.5)
EOSINOPHIL NFR BLD AUTO: 4 % — SIGNIFICANT CHANGE UP (ref 0–6)
GLUCOSE SERPL-MCNC: 95 MG/DL — SIGNIFICANT CHANGE UP (ref 70–99)
HCT VFR BLD CALC: 36.5 % — SIGNIFICANT CHANGE UP (ref 34.5–45)
HGB BLD-MCNC: 12.2 G/DL — SIGNIFICANT CHANGE UP (ref 11.5–15.5)
IANC: 1.65 K/UL — LOW (ref 1.8–7.4)
IMM GRANULOCYTES NFR BLD AUTO: 0 % — SIGNIFICANT CHANGE UP (ref 0–0.9)
LYMPHOCYTES # BLD AUTO: 2.46 K/UL — SIGNIFICANT CHANGE UP (ref 1–3.3)
LYMPHOCYTES # BLD AUTO: 54.2 % — HIGH (ref 13–44)
MCHC RBC-ENTMCNC: 31.3 PG — SIGNIFICANT CHANGE UP (ref 27–34)
MCHC RBC-ENTMCNC: 33.4 G/DL — SIGNIFICANT CHANGE UP (ref 32–36)
MCV RBC AUTO: 93.6 FL — SIGNIFICANT CHANGE UP (ref 80–100)
MONOCYTES # BLD AUTO: 0.22 K/UL — SIGNIFICANT CHANGE UP (ref 0–0.9)
MONOCYTES NFR BLD AUTO: 4.8 % — SIGNIFICANT CHANGE UP (ref 2–14)
NEUTROPHILS # BLD AUTO: 1.65 K/UL — LOW (ref 1.8–7.4)
NEUTROPHILS NFR BLD AUTO: 36.3 % — LOW (ref 43–77)
NRBC # BLD: 0 /100 WBCS — SIGNIFICANT CHANGE UP (ref 0–0)
NRBC # FLD: 0 K/UL — SIGNIFICANT CHANGE UP (ref 0–0)
PLATELET # BLD AUTO: 203 K/UL — SIGNIFICANT CHANGE UP (ref 150–400)
POTASSIUM SERPL-MCNC: 3.6 MMOL/L — SIGNIFICANT CHANGE UP (ref 3.5–5.3)
POTASSIUM SERPL-SCNC: 3.6 MMOL/L — SIGNIFICANT CHANGE UP (ref 3.5–5.3)
PROT SERPL-MCNC: 7.8 G/DL — SIGNIFICANT CHANGE UP (ref 6–8.3)
RBC # BLD: 3.9 M/UL — SIGNIFICANT CHANGE UP (ref 3.8–5.2)
RBC # FLD: 12.5 % — SIGNIFICANT CHANGE UP (ref 10.3–14.5)
SODIUM SERPL-SCNC: 139 MMOL/L — SIGNIFICANT CHANGE UP (ref 135–145)
TROPONIN T, HIGH SENSITIVITY RESULT: <6 NG/L — SIGNIFICANT CHANGE UP
WBC # BLD: 4.54 K/UL — SIGNIFICANT CHANGE UP (ref 3.8–10.5)
WBC # FLD AUTO: 4.54 K/UL — SIGNIFICANT CHANGE UP (ref 3.8–10.5)

## 2024-12-03 PROCEDURE — 93010 ELECTROCARDIOGRAM REPORT: CPT

## 2024-12-03 PROCEDURE — 99285 EMERGENCY DEPT VISIT HI MDM: CPT

## 2024-12-03 PROCEDURE — 71046 X-RAY EXAM CHEST 2 VIEWS: CPT | Mod: 26

## 2024-12-03 NOTE — ED PROVIDER NOTE - PATIENT PORTAL LINK FT
You can access the FollowMyHealth Patient Portal offered by [facility] by registering at the following website: [email or web address]. By joining [employer]’s FollowMyHealth portal, you will also be able to view your health information using other applications (apps) compatible with our system.

## 2024-12-03 NOTE — ED PROVIDER NOTE - NSFOLLOWUPCLINICS_GEN_ALL_ED_FT
[facility]  Orthopedic Surgery  [address], [address]rd & [address]th [place] [address]  Phone: [phone]  Fax:     [facility]  Orthopedic Surgery  [address] [address]  Phone: [phone]  Fax:

## 2024-12-03 NOTE — ED PROVIDER NOTE - NSFOLLOWUPINSTRUCTIONS_ED_ALL_ED_FT
- You were seen in the emergency department today for chest pain with numbness tingling to left arm.    - This is likely due to cervical radiculopathy and would like you to follow up with spine doctor for an MRI. Your troponin and ekg were not concerning for hear damage to explain your symptoms.     - Lab and imaging results, if performed, were discussed with you along with your discharge diagnosis.    - Follow up with your primary doctor within 1 week and also with spine doctor.    - Return to the ED for any new, worsening, or concerning symptoms to you including increased chest pain, shortness of breath, dizziness lightheadedness, feelings of passing out, sweating with chest pain.    - Continue all prescribed medications.    - Take ibuprofen/tylenol as directed as needed for pain.     - Rest and keep yourself hydrated with fluids.

## 2024-12-03 NOTE — ED PROVIDER NOTE - CLINICAL SUMMARY MEDICAL DECISION MAKING FREE TEXT BOX
54 yo no past medical history here with a cc of chest pain for 3 days. PE non focal. Differential includes ACS versus cervical radiculopathy.  Will obtain tropes, labs, chest x-ray, EKG and reassess.

## 2024-12-03 NOTE — ED ADULT TRIAGE NOTE - CHIEF COMPLAINT QUOTE
left arm tingling/numbness left arm started 3 days ago  zero drift, zero slurred speech ,zero obvious weakness, zero facial

## 2024-12-03 NOTE — ED ADULT NURSE NOTE - NSFALLUNIVINTERV_ED_ALL_ED
Bed/Stretcher in lowest position, wheels locked, appropriate side rails in place/Call bell, personal items and telephone in reach/Instruct patient to call for assistance before getting out of bed/chair/stretcher/Non-slip footwear applied when patient is off stretcher/[place] to call system/Physically safe environment - no spills, clutter or unnecessary equipment/Purposeful proactive rounding/Room/bathroom lighting operational, light cord in reach

## 2024-12-03 NOTE — ED ADULT NURSE NOTE - OBJECTIVE STATEMENT
Pt received to rm 23  , awake and alert, A&OX4, ambulatory. C/o chest pain over 3 days. pt also has left arm numbness. pt hx pf HTN.  pt placed on cardiac monitor. NSR. Respirations even and unlabored. Denies , SOB, N/V, HA, dizziness, palpitations, blurry vision. 20G IV placed to  right AC  . Bed in lowest position, call bell within reach. Safety maintained.

## 2024-12-03 NOTE — ED ADULT NURSE REASSESSMENT NOTE - NS ED NURSE REASSESS COMMENT FT1
Report received from day MESHA Chandler. Pt is A&Ox4 and ambulatory without assistance. Pt appears comfortable and in NAD. Pt still c/o slight numbness to her L arm. Denies chest pain, N/V, headache. Respirations even and unlabored. VS as noted. Safety maintained with stretcher in lowest position.

## 2024-12-03 NOTE — ED PROVIDER NOTE - OBJECTIVE STATEMENT
54 yo no past medical history here with a cc of left arm numbness tingling for 3 days and 1 day hx of chest pain. Patient is here because for the past 3 days she has had intermittent episodes of left arm numbness and tingling which she states gets worse at nighttime but gets better throughout the day.  Patient also with hx of disc herniation for which she received physical therapy for earlier this year. Patient states that she came in today because this evening she had an onset of left-sided chest pain which she stated lasted a few seconds associated with palpitations.  Patient denying diaphoresis, dizziness, nausea, vomiting. Patient had similar episode 1 year prior and got a full workup which was negative.  Patient follows closely with cardiologist and was monitored for similar symptoms earlier this past year which were unremarkable.

## 2024-12-03 NOTE — ED PROVIDER NOTE - ATTENDING CONTRIBUTION TO CARE
Frye healthy female with no significant cardiovascular risk factors presents emergency department for evaluation of atypical chest pain.  Diffuse substernal, nonradiating, no associated shortness of breath, nausea, vomiting, diaphoresis.  EKG normal sinus rhythm without acute ischemia, normal intervals.  Patient very well-appearing.  Low suspicion for ACS, will obtain labs of troponin and chest x-ray.  Patient also complaining of intermittent left arm numbness mostly in the anterior region from the shoulder down towards the hand. States many years ago she had similar symptoms and went to PT and it got better.  She has no decree strength at all does have some subjective decrease sensation to the anterior arm, no significant numbness to the hand. Patient has chronic neck pain, nothing midline.  Advised that she likely needs to go back to physical therapy, will refer for spine [facility] for further evaluation, no indication for emergent imaging given the significant neurodeficits, no midline tenderness. Labs and x-ray here are normal, will discharge to follow-up with her own cardiologist, states she has had an echocardiogram in the past that was normal and can follow-up with her own doctor. Frye healthy female with no significant cardiovascular risk factors presents emergency department for evaluation of atypical chest pain.  Diffuse substernal, nonradiating, no associated shortness of breath, nausea, vomiting, diaphoresis.  EKG normal sinus rhythm without acute ischemia, normal intervals.  Patient very well-appearing.  Low suspicion for ACS, will obtain labs of troponin and chest x-ray.  Patient also complaining of intermittent left arm numbness mostly in the anterior region from the shoulder down towards the hand. States many years ago she had similar symptoms and went to PT and it got better.  She has no decree strength at all does have some subjective decrease sensation to the anterior arm, no significant numbness to the hand. Patient has chronic neck pain, nothing midline.  Advised that she likely needs to go back to physical therapy, will refer for spine [facility] for further evaluation, no indication for emergent imaging given the significant neurodeficits, no midline tenderness. No c/f PE Wells Score 0.  Labs and x-ray here are normal, will discharge to follow-up with her own cardiologist, states she has had an echocardiogram in the past that was normal and can follow-up with her own doctor.

## 2025-01-03 ENCOUNTER — APPOINTMENT (OUTPATIENT)
Dept: ULTRASOUND IMAGING | Facility: IMAGING CENTER | Age: 55
End: 2025-01-03

## 2025-01-03 ENCOUNTER — APPOINTMENT (OUTPATIENT)
Dept: MAMMOGRAPHY | Facility: IMAGING CENTER | Age: 55
End: 2025-01-03

## 2025-02-05 ENCOUNTER — APPOINTMENT (OUTPATIENT)
Dept: ULTRASOUND IMAGING | Facility: CLINIC | Age: 55
End: 2025-02-05
Payer: COMMERCIAL

## 2025-02-05 PROCEDURE — 76775 US EXAM ABDO BACK WALL LIM: CPT

## 2025-03-15 ENCOUNTER — APPOINTMENT (OUTPATIENT)
Dept: MAMMOGRAPHY | Facility: CLINIC | Age: 55
End: 2025-03-15
Payer: COMMERCIAL

## 2025-03-15 ENCOUNTER — OUTPATIENT (OUTPATIENT)
Dept: OUTPATIENT SERVICES | Facility: HOSPITAL | Age: 55
LOS: 1 days | End: 2025-03-15
Payer: COMMERCIAL

## 2025-03-15 ENCOUNTER — APPOINTMENT (OUTPATIENT)
Dept: ULTRASOUND IMAGING | Facility: CLINIC | Age: 55
End: 2025-03-15
Payer: COMMERCIAL

## 2025-03-15 DIAGNOSIS — Z00.8 ENCOUNTER FOR OTHER GENERAL EXAMINATION: ICD-10-CM

## 2025-03-15 DIAGNOSIS — Z98.89 OTHER SPECIFIED POSTPROCEDURAL STATES: Chronic | ICD-10-CM

## 2025-03-15 DIAGNOSIS — N97.9 FEMALE INFERTILITY, UNSPECIFIED: Chronic | ICD-10-CM

## 2025-03-15 PROCEDURE — 77066 DX MAMMO INCL CAD BI: CPT | Mod: 26

## 2025-03-15 PROCEDURE — 76641 ULTRASOUND BREAST COMPLETE: CPT

## 2025-03-15 PROCEDURE — 77062 BREAST TOMOSYNTHESIS BI: CPT | Mod: 26

## 2025-03-15 PROCEDURE — G0279: CPT

## 2025-03-15 PROCEDURE — 76641 ULTRASOUND BREAST COMPLETE: CPT | Mod: 26,50

## 2025-03-15 PROCEDURE — G0279: CPT | Mod: 26

## 2025-03-15 PROCEDURE — 77066 DX MAMMO INCL CAD BI: CPT

## 2025-08-12 ENCOUNTER — APPOINTMENT (OUTPATIENT)
Dept: ORTHOPEDIC SURGERY | Facility: CLINIC | Age: 55
End: 2025-08-12

## 2025-08-14 ENCOUNTER — APPOINTMENT (OUTPATIENT)
Dept: ORTHOPEDIC SURGERY | Facility: CLINIC | Age: 55
End: 2025-08-14
Payer: COMMERCIAL

## 2025-08-14 VITALS — HEIGHT: 64 IN | WEIGHT: 173 LBS | BODY MASS INDEX: 29.53 KG/M2

## 2025-08-14 DIAGNOSIS — M17.12 UNILATERAL PRIMARY OSTEOARTHRITIS, LEFT KNEE: ICD-10-CM

## 2025-08-14 PROCEDURE — 20610 DRAIN/INJ JOINT/BURSA W/O US: CPT | Mod: LT

## 2025-08-14 PROCEDURE — 99214 OFFICE O/P EST MOD 30 MIN: CPT | Mod: 25

## 2025-08-22 ENCOUNTER — APPOINTMENT (OUTPATIENT)
Dept: ORTHOPEDIC SURGERY | Facility: CLINIC | Age: 55
End: 2025-08-22
Payer: COMMERCIAL

## 2025-08-22 VITALS — BODY MASS INDEX: 29.53 KG/M2 | WEIGHT: 173 LBS | HEIGHT: 64 IN

## 2025-08-22 DIAGNOSIS — M17.11 UNILATERAL PRIMARY OSTEOARTHRITIS, RIGHT KNEE: ICD-10-CM

## 2025-08-22 DIAGNOSIS — M17.12 UNILATERAL PRIMARY OSTEOARTHRITIS, LEFT KNEE: ICD-10-CM

## 2025-08-22 PROCEDURE — 99214 OFFICE O/P EST MOD 30 MIN: CPT

## 2025-08-25 ENCOUNTER — EMERGENCY (EMERGENCY)
Facility: HOSPITAL | Age: 55
LOS: 1 days | End: 2025-08-25
Attending: STUDENT IN AN ORGANIZED HEALTH CARE EDUCATION/TRAINING PROGRAM | Admitting: EMERGENCY MEDICINE
Payer: COMMERCIAL

## 2025-08-25 VITALS
WEIGHT: 173.06 LBS | HEIGHT: 64 IN | DIASTOLIC BLOOD PRESSURE: 84 MMHG | HEART RATE: 81 BPM | TEMPERATURE: 99 F | RESPIRATION RATE: 18 BRPM | OXYGEN SATURATION: 99 % | SYSTOLIC BLOOD PRESSURE: 136 MMHG

## 2025-08-25 DIAGNOSIS — Z98.89 OTHER SPECIFIED POSTPROCEDURAL STATES: Chronic | ICD-10-CM

## 2025-08-25 DIAGNOSIS — N97.9 FEMALE INFERTILITY, UNSPECIFIED: Chronic | ICD-10-CM

## 2025-08-25 LAB
ADD ON TEST-SPECIMEN IN LAB: SIGNIFICANT CHANGE UP
ALBUMIN SERPL ELPH-MCNC: 4 G/DL — SIGNIFICANT CHANGE UP (ref 3.3–5)
ALP SERPL-CCNC: 81 U/L — SIGNIFICANT CHANGE UP (ref 40–120)
ALT FLD-CCNC: 15 U/L — SIGNIFICANT CHANGE UP (ref 4–33)
ANION GAP SERPL CALC-SCNC: 9 MMOL/L — SIGNIFICANT CHANGE UP (ref 7–14)
AST SERPL-CCNC: 19 U/L — SIGNIFICANT CHANGE UP (ref 4–32)
BASOPHILS # BLD AUTO: 0.02 K/UL — SIGNIFICANT CHANGE UP (ref 0–0.2)
BASOPHILS NFR BLD AUTO: 0.4 % — SIGNIFICANT CHANGE UP (ref 0–2)
BILIRUB SERPL-MCNC: 0.4 MG/DL — SIGNIFICANT CHANGE UP (ref 0.2–1.2)
BUN SERPL-MCNC: 11 MG/DL — SIGNIFICANT CHANGE UP (ref 7–23)
CALCIUM SERPL-MCNC: 9.6 MG/DL — SIGNIFICANT CHANGE UP (ref 8.4–10.5)
CHLORIDE SERPL-SCNC: 102 MMOL/L — SIGNIFICANT CHANGE UP (ref 98–107)
CO2 SERPL-SCNC: 26 MMOL/L — SIGNIFICANT CHANGE UP (ref 22–31)
CREAT SERPL-MCNC: 0.65 MG/DL — SIGNIFICANT CHANGE UP (ref 0.5–1.3)
D DIMER BLD IA.RAPID-MCNC: <150 NG/ML DDU — SIGNIFICANT CHANGE UP
EGFR: 104 ML/MIN/1.73M2 — SIGNIFICANT CHANGE UP
EGFR: 104 ML/MIN/1.73M2 — SIGNIFICANT CHANGE UP
EOSINOPHIL # BLD AUTO: 0.1 K/UL — SIGNIFICANT CHANGE UP (ref 0–0.5)
EOSINOPHIL NFR BLD AUTO: 2 % — SIGNIFICANT CHANGE UP (ref 0–6)
FLUAV AG NPH QL: SIGNIFICANT CHANGE UP
FLUBV AG NPH QL: SIGNIFICANT CHANGE UP
GLUCOSE SERPL-MCNC: 97 MG/DL — SIGNIFICANT CHANGE UP (ref 70–99)
HCT VFR BLD CALC: 35 % — SIGNIFICANT CHANGE UP (ref 34.5–45)
HGB BLD-MCNC: 11.8 G/DL — SIGNIFICANT CHANGE UP (ref 11.5–15.5)
IMM GRANULOCYTES # BLD AUTO: 0.01 K/UL — SIGNIFICANT CHANGE UP (ref 0–0.07)
IMM GRANULOCYTES NFR BLD AUTO: 0.2 % — SIGNIFICANT CHANGE UP (ref 0–0.9)
LYMPHOCYTES # BLD AUTO: 2.09 K/UL — SIGNIFICANT CHANGE UP (ref 1–3.3)
LYMPHOCYTES NFR BLD AUTO: 42.6 % — SIGNIFICANT CHANGE UP (ref 13–44)
MCHC RBC-ENTMCNC: 31.4 PG — SIGNIFICANT CHANGE UP (ref 27–34)
MCHC RBC-ENTMCNC: 33.7 G/DL — SIGNIFICANT CHANGE UP (ref 32–36)
MCV RBC AUTO: 93.1 FL — SIGNIFICANT CHANGE UP (ref 80–100)
MONOCYTES # BLD AUTO: 0.32 K/UL — SIGNIFICANT CHANGE UP (ref 0–0.9)
MONOCYTES NFR BLD AUTO: 6.5 % — SIGNIFICANT CHANGE UP (ref 2–14)
NEUTROPHILS # BLD AUTO: 2.37 K/UL — SIGNIFICANT CHANGE UP (ref 1.8–7.4)
NEUTROPHILS NFR BLD AUTO: 48.3 % — SIGNIFICANT CHANGE UP (ref 43–77)
NRBC # BLD AUTO: 0 K/UL — SIGNIFICANT CHANGE UP (ref 0–0)
NRBC # FLD: 0 K/UL — SIGNIFICANT CHANGE UP (ref 0–0)
NRBC BLD AUTO-RTO: 0 /100 WBCS — SIGNIFICANT CHANGE UP (ref 0–0)
PLATELET # BLD AUTO: 260 K/UL — SIGNIFICANT CHANGE UP (ref 150–400)
PMV BLD: 10 FL — SIGNIFICANT CHANGE UP (ref 7–13)
POTASSIUM SERPL-MCNC: 4.1 MMOL/L — SIGNIFICANT CHANGE UP (ref 3.5–5.3)
POTASSIUM SERPL-SCNC: 4.1 MMOL/L — SIGNIFICANT CHANGE UP (ref 3.5–5.3)
PROT SERPL-MCNC: 7.3 G/DL — SIGNIFICANT CHANGE UP (ref 6–8.3)
RBC # BLD: 3.76 M/UL — LOW (ref 3.8–5.2)
RBC # FLD: 12.5 % — SIGNIFICANT CHANGE UP (ref 10.3–14.5)
RSV RNA NPH QL NAA+NON-PROBE: SIGNIFICANT CHANGE UP
SARS-COV-2 RNA SPEC QL NAA+PROBE: SIGNIFICANT CHANGE UP
SODIUM SERPL-SCNC: 137 MMOL/L — SIGNIFICANT CHANGE UP (ref 135–145)
SOURCE RESPIRATORY: SIGNIFICANT CHANGE UP
TROPONIN T, HIGH SENSITIVITY RESULT: 9 NG/L — SIGNIFICANT CHANGE UP
WBC # BLD: 4.91 K/UL — SIGNIFICANT CHANGE UP (ref 3.8–10.5)
WBC # FLD AUTO: 4.91 K/UL — SIGNIFICANT CHANGE UP (ref 3.8–10.5)

## 2025-08-25 PROCEDURE — 71046 X-RAY EXAM CHEST 2 VIEWS: CPT | Mod: 26

## 2025-08-25 PROCEDURE — 93010 ELECTROCARDIOGRAM REPORT: CPT

## 2025-08-25 PROCEDURE — 99223 1ST HOSP IP/OBS HIGH 75: CPT

## 2025-08-25 RX ORDER — AMLODIPINE BESYLATE 10 MG/1
5 TABLET ORAL DAILY
Refills: 0 | Status: ACTIVE | OUTPATIENT
Start: 2025-08-25 | End: 2026-07-24

## 2025-08-25 RX ORDER — KETOROLAC TROMETHAMINE 30 MG/ML
30 INJECTION, SOLUTION INTRAMUSCULAR; INTRAVENOUS ONCE
Refills: 0 | Status: DISCONTINUED | OUTPATIENT
Start: 2025-08-25 | End: 2025-08-25

## 2025-08-25 RX ORDER — MAGNESIUM, ALUMINUM HYDROXIDE 200-200 MG
30 TABLET,CHEWABLE ORAL EVERY 4 HOURS
Refills: 0 | Status: ACTIVE | OUTPATIENT
Start: 2025-08-25 | End: 2026-07-24

## 2025-08-25 RX ORDER — SIMETHICONE 80 MG
80 TABLET,CHEWABLE ORAL ONCE
Refills: 0 | Status: COMPLETED | OUTPATIENT
Start: 2025-08-25 | End: 2025-08-25

## 2025-08-25 RX ADMIN — KETOROLAC TROMETHAMINE 30 MILLIGRAM(S): 30 INJECTION, SOLUTION INTRAMUSCULAR; INTRAVENOUS at 13:01

## 2025-08-25 RX ADMIN — Medication 80 MILLIGRAM(S): at 23:57

## 2025-08-25 RX ADMIN — Medication 30 MILLILITER(S): at 23:57

## 2025-08-26 ENCOUNTER — RESULT REVIEW (OUTPATIENT)
Age: 55
End: 2025-08-26

## 2025-08-26 VITALS
SYSTOLIC BLOOD PRESSURE: 113 MMHG | RESPIRATION RATE: 16 BRPM | HEART RATE: 76 BPM | DIASTOLIC BLOOD PRESSURE: 80 MMHG | OXYGEN SATURATION: 97 % | TEMPERATURE: 99 F

## 2025-08-26 PROCEDURE — 99283 EMERGENCY DEPT VISIT LOW MDM: CPT

## 2025-08-26 PROCEDURE — 78451 HT MUSCLE IMAGE SPECT SING: CPT | Mod: 26

## 2025-08-26 PROCEDURE — 93016 CV STRESS TEST SUPVJ ONLY: CPT | Mod: GC

## 2025-08-26 PROCEDURE — 99239 HOSP IP/OBS DSCHRG MGMT >30: CPT

## 2025-08-26 PROCEDURE — 93018 CV STRESS TEST I&R ONLY: CPT | Mod: GC

## 2025-08-26 RX ADMIN — AMLODIPINE BESYLATE 5 MILLIGRAM(S): 10 TABLET ORAL at 06:09

## 2025-08-29 ENCOUNTER — APPOINTMENT (OUTPATIENT)
Dept: ORTHOPEDIC SURGERY | Facility: CLINIC | Age: 55
End: 2025-08-29

## 2025-09-11 ENCOUNTER — APPOINTMENT (OUTPATIENT)
Dept: ORTHOPEDIC SURGERY | Facility: CLINIC | Age: 55
End: 2025-09-11
Payer: COMMERCIAL

## 2025-09-11 VITALS
OXYGEN SATURATION: 97 % | HEIGHT: 64 IN | HEART RATE: 67 BPM | DIASTOLIC BLOOD PRESSURE: 87 MMHG | WEIGHT: 174 LBS | SYSTOLIC BLOOD PRESSURE: 120 MMHG | BODY MASS INDEX: 29.71 KG/M2

## 2025-09-11 DIAGNOSIS — M17.12 UNILATERAL PRIMARY OSTEOARTHRITIS, LEFT KNEE: ICD-10-CM

## 2025-09-11 DIAGNOSIS — M17.11 UNILATERAL PRIMARY OSTEOARTHRITIS, RIGHT KNEE: ICD-10-CM

## 2025-09-11 PROCEDURE — 20610 DRAIN/INJ JOINT/BURSA W/O US: CPT | Mod: 50

## 2025-09-11 PROCEDURE — 99204 OFFICE O/P NEW MOD 45 MIN: CPT | Mod: 25
